# Patient Record
Sex: MALE | Race: WHITE | Employment: FULL TIME | URBAN - METROPOLITAN AREA
[De-identification: names, ages, dates, MRNs, and addresses within clinical notes are randomized per-mention and may not be internally consistent; named-entity substitution may affect disease eponyms.]

---

## 2022-03-30 ENCOUNTER — HOSPITAL ENCOUNTER (INPATIENT)
Age: 52
LOS: 2 days | Discharge: HOME | DRG: 637 | End: 2022-04-02
Attending: STUDENT IN AN ORGANIZED HEALTH CARE EDUCATION/TRAINING PROGRAM | Admitting: FAMILY MEDICINE
Payer: COMMERCIAL

## 2022-03-30 DIAGNOSIS — R73.9 HYPERGLYCEMIA: Primary | ICD-10-CM

## 2022-03-30 DIAGNOSIS — E11.9 DIABETES MELLITUS, NEW ONSET (HCC): ICD-10-CM

## 2022-03-30 DIAGNOSIS — E11.9 NEW ONSET TYPE 2 DIABETES MELLITUS (HCC): ICD-10-CM

## 2022-03-30 DIAGNOSIS — E87.20 METABOLIC ACIDOSIS: ICD-10-CM

## 2022-03-30 LAB
ALBUMIN SERPL-MCNC: 3.9 G/DL (ref 3.5–5)
ALBUMIN/GLOB SERPL: ABNORMAL {RATIO} (ref 1.1–2.2)
ALP SERPL-CCNC: 114 U/L (ref 45–117)
ALT SERPL-CCNC: ABNORMAL U/L (ref 12–78)
ANION GAP SERPL CALC-SCNC: 13 MMOL/L (ref 5–15)
APPEARANCE UR: CLEAR
AST SERPL-CCNC: ABNORMAL U/L (ref 15–37)
BACTERIA URNS QL MICRO: NEGATIVE /HPF
BASE DEFICIT BLDV-SCNC: 14.2 MMOL/L
BASOPHILS # BLD: 0 K/UL (ref 0–0.1)
BASOPHILS NFR BLD: 1 % (ref 0–1)
BILIRUB SERPL-MCNC: 1.2 MG/DL (ref 0.2–1)
BILIRUB UR QL: NEGATIVE
BUN SERPL-MCNC: 11 MG/DL (ref 6–20)
BUN/CREAT SERPL: 9 (ref 12–20)
CALCIUM SERPL-MCNC: ABNORMAL MG/DL (ref 8.5–10.1)
CHLORIDE SERPL-SCNC: 103 MMOL/L (ref 97–108)
CO2 SERPL-SCNC: 14 MMOL/L (ref 21–32)
COLOR UR: ABNORMAL
COMMENT, HOLDF: NORMAL
COMMENT, HOLDF: NORMAL
CREAT SERPL-MCNC: 1.16 MG/DL (ref 0.7–1.3)
DIFFERENTIAL METHOD BLD: ABNORMAL
EOSINOPHIL # BLD: 0.1 K/UL (ref 0–0.4)
EOSINOPHIL NFR BLD: 2 % (ref 0–7)
EPITH CASTS URNS QL MICRO: ABNORMAL /LPF
ERYTHROCYTE [DISTWIDTH] IN BLOOD BY AUTOMATED COUNT: 13.2 % (ref 11.5–14.5)
GLOBULIN SER CALC-MCNC: ABNORMAL G/DL (ref 2–4)
GLUCOSE BLD STRIP.AUTO-MCNC: 252 MG/DL (ref 65–117)
GLUCOSE BLD STRIP.AUTO-MCNC: 314 MG/DL (ref 65–117)
GLUCOSE SERPL-MCNC: 332 MG/DL (ref 65–100)
GLUCOSE UR STRIP.AUTO-MCNC: >=1000 MG/DL
HCO3 BLDV-SCNC: 11.7 MMOL/L (ref 23–28)
HCT VFR BLD AUTO: 46.1 % (ref 36.6–50.3)
HGB BLD-MCNC: 17.2 G/DL (ref 12.1–17)
HGB UR QL STRIP: ABNORMAL
IMM GRANULOCYTES # BLD AUTO: 0.1 K/UL (ref 0–0.04)
IMM GRANULOCYTES NFR BLD AUTO: 1 % (ref 0–0.5)
KETONES UR QL STRIP.AUTO: >=160 MG/DL
LEUKOCYTE ESTERASE UR QL STRIP.AUTO: NEGATIVE
LIPASE SERPL-CCNC: 104 U/L (ref 73–393)
LYMPHOCYTES # BLD: 1.6 K/UL (ref 0.8–3.5)
LYMPHOCYTES NFR BLD: 28 % (ref 12–49)
MCH RBC QN AUTO: 32.8 PG (ref 26–34)
MCHC RBC AUTO-ENTMCNC: 37.3 G/DL (ref 30–36.5)
MCV RBC AUTO: 88 FL (ref 80–99)
MONOCYTES # BLD: 0.6 K/UL (ref 0–1)
MONOCYTES NFR BLD: 11 % (ref 5–13)
NEUTS SEG # BLD: 3.3 K/UL (ref 1.8–8)
NEUTS SEG NFR BLD: 57 % (ref 32–75)
NITRITE UR QL STRIP.AUTO: NEGATIVE
NRBC # BLD: 0 K/UL (ref 0–0.01)
NRBC BLD-RTO: 0 PER 100 WBC
PCO2 BLDV: 28.3 MMHG (ref 41–51)
PH BLDV: 7.23 [PH] (ref 7.32–7.42)
PH UR STRIP: 5 [PH] (ref 5–8)
PLATELET # BLD AUTO: 146 K/UL (ref 150–400)
PMV BLD AUTO: 9.1 FL (ref 8.9–12.9)
PO2 BLDV: 37 MMHG (ref 25–40)
POTASSIUM SERPL-SCNC: 4 MMOL/L (ref 3.5–5.1)
PROT SERPL-MCNC: ABNORMAL G/DL (ref 6.4–8.2)
PROT UR STRIP-MCNC: 30 MG/DL
RBC # BLD AUTO: 5.24 M/UL (ref 4.1–5.7)
RBC #/AREA URNS HPF: ABNORMAL /HPF (ref 0–5)
SAMPLES BEING HELD,HOLD: NORMAL
SAMPLES BEING HELD,HOLD: NORMAL
SAO2 % BLDV: 61.1 % (ref 65–88)
SERVICE CMNT-IMP: ABNORMAL
SODIUM SERPL-SCNC: 130 MMOL/L (ref 136–145)
SPECIMEN TYPE: ABNORMAL
UROBILINOGEN UR QL STRIP.AUTO: 0.2 EU/DL (ref 0.2–1)
WBC # BLD AUTO: 5.8 K/UL (ref 4.1–11.1)
WBC URNS QL MICRO: ABNORMAL /HPF (ref 0–4)

## 2022-03-30 PROCEDURE — 82010 KETONE BODYS QUAN: CPT

## 2022-03-30 PROCEDURE — 85025 COMPLETE CBC W/AUTO DIFF WBC: CPT

## 2022-03-30 PROCEDURE — 82962 GLUCOSE BLOOD TEST: CPT

## 2022-03-30 PROCEDURE — 81001 URINALYSIS AUTO W/SCOPE: CPT

## 2022-03-30 PROCEDURE — 82803 BLOOD GASES ANY COMBINATION: CPT

## 2022-03-30 PROCEDURE — 93005 ELECTROCARDIOGRAM TRACING: CPT

## 2022-03-30 PROCEDURE — 83690 ASSAY OF LIPASE: CPT

## 2022-03-30 PROCEDURE — 83036 HEMOGLOBIN GLYCOSYLATED A1C: CPT

## 2022-03-30 PROCEDURE — 74011250636 HC RX REV CODE- 250/636: Performed by: STUDENT IN AN ORGANIZED HEALTH CARE EDUCATION/TRAINING PROGRAM

## 2022-03-30 PROCEDURE — 80053 COMPREHEN METABOLIC PANEL: CPT

## 2022-03-30 PROCEDURE — 36415 COLL VENOUS BLD VENIPUNCTURE: CPT

## 2022-03-30 PROCEDURE — 99285 EMERGENCY DEPT VISIT HI MDM: CPT

## 2022-03-30 PROCEDURE — 96361 HYDRATE IV INFUSION ADD-ON: CPT

## 2022-03-30 RX ORDER — BENZALKONIUM CHLORIDE 1.3 MG/ML
CLOTH TOPICAL
Qty: 100 STRIP | Refills: 2 | Status: SHIPPED | OUTPATIENT
Start: 2022-03-30

## 2022-03-30 RX ORDER — LANCETS
EACH MISCELLANEOUS
Qty: 1 EACH | Refills: 11 | Status: SHIPPED | OUTPATIENT
Start: 2022-03-30

## 2022-03-30 RX ORDER — SODIUM CHLORIDE 9 MG/ML
2000 INJECTION, SOLUTION INTRAVENOUS ONCE
Status: COMPLETED | OUTPATIENT
Start: 2022-03-30 | End: 2022-03-30

## 2022-03-30 RX ORDER — INSULIN PUMP SYRINGE, 3 ML
EACH MISCELLANEOUS
Qty: 1 KIT | Refills: 0 | Status: SHIPPED | OUTPATIENT
Start: 2022-03-30

## 2022-03-30 RX ORDER — METFORMIN HYDROCHLORIDE 500 MG/1
500 TABLET ORAL 2 TIMES DAILY WITH MEALS
Qty: 60 TABLET | Refills: 0 | Status: SHIPPED | OUTPATIENT
Start: 2022-03-30 | End: 2022-04-02 | Stop reason: SDUPTHER

## 2022-03-30 RX ADMIN — SODIUM CHLORIDE 2000 ML: 9 INJECTION, SOLUTION INTRAVENOUS at 19:58

## 2022-03-30 NOTE — ED TRIAGE NOTES
Pt presents to ER with c/o frequent urination, dry mouth, nausea, constant thirst for the last 3 weeks with worsening sx today. BG 314mg/dl in triage. Pt has family history of T2DM, but no personal history. Sent from [facility] for hyperglycemia work-up and abdominal pain.

## 2022-03-31 ENCOUNTER — APPOINTMENT (OUTPATIENT)
Dept: CT IMAGING | Age: 52
DRG: 637 | End: 2022-03-31
Attending: INTERNAL MEDICINE
Payer: COMMERCIAL

## 2022-03-31 PROBLEM — E11.9 NEW ONSET TYPE 2 DIABETES MELLITUS (HCC): Status: ACTIVE | Noted: 2022-03-31

## 2022-03-31 LAB
ANION GAP SERPL CALC-SCNC: 9 MMOL/L (ref 5–15)
ATRIAL RATE: 90 BPM
B-OH-BUTYR SERPL-SCNC: >4.42 MMOL/L
BUN SERPL-MCNC: 10 MG/DL (ref 6–20)
BUN/CREAT SERPL: 10 (ref 12–20)
CALCIUM SERPL-MCNC: 7 MG/DL (ref 8.5–10.1)
CALCULATED P AXIS, ECG09: 29 DEGREES
CALCULATED R AXIS, ECG10: 6 DEGREES
CALCULATED T AXIS, ECG11: 15 DEGREES
CHLORIDE SERPL-SCNC: 113 MMOL/L (ref 97–108)
CHOLEST SERPL-MCNC: 246 MG/DL
CO2 SERPL-SCNC: 15 MMOL/L (ref 21–32)
CREAT SERPL-MCNC: 1.04 MG/DL (ref 0.7–1.3)
DIAGNOSIS, 93000: NORMAL
ERYTHROCYTE [DISTWIDTH] IN BLOOD BY AUTOMATED COUNT: 13.1 % (ref 11.5–14.5)
EST. AVERAGE GLUCOSE BLD GHB EST-MCNC: 243 MG/DL
GLUCOSE BLD STRIP.AUTO-MCNC: 205 MG/DL (ref 65–117)
GLUCOSE BLD STRIP.AUTO-MCNC: 221 MG/DL (ref 65–117)
GLUCOSE BLD STRIP.AUTO-MCNC: 222 MG/DL (ref 65–117)
GLUCOSE BLD STRIP.AUTO-MCNC: 227 MG/DL (ref 65–117)
GLUCOSE BLD STRIP.AUTO-MCNC: 242 MG/DL (ref 65–117)
GLUCOSE SERPL-MCNC: 266 MG/DL (ref 65–100)
HBA1C MFR BLD: 10.1 % (ref 4–5.6)
HCT VFR BLD AUTO: 39.4 % (ref 36.6–50.3)
HDLC SERPL-MCNC: 16 MG/DL
HDLC SERPL: 15.4 {RATIO} (ref 0–5)
HGB BLD-MCNC: 14.4 G/DL (ref 12.1–17)
LDLC SERPL CALC-MCNC: ABNORMAL MG/DL (ref 0–100)
LDLC SERPL DIRECT ASSAY-MCNC: 50 MG/DL (ref 0–100)
MCH RBC QN AUTO: 32.4 PG (ref 26–34)
MCHC RBC AUTO-ENTMCNC: 36.5 G/DL (ref 30–36.5)
MCV RBC AUTO: 88.7 FL (ref 80–99)
NRBC # BLD: 0 K/UL (ref 0–0.01)
NRBC BLD-RTO: 0 PER 100 WBC
P-R INTERVAL, ECG05: 146 MS
PLATELET # BLD AUTO: 115 K/UL (ref 150–400)
PMV BLD AUTO: 8.7 FL (ref 8.9–12.9)
POTASSIUM SERPL-SCNC: 3.5 MMOL/L (ref 3.5–5.1)
Q-T INTERVAL, ECG07: 368 MS
QRS DURATION, ECG06: 96 MS
QTC CALCULATION (BEZET), ECG08: 450 MS
RBC # BLD AUTO: 4.44 M/UL (ref 4.1–5.7)
SERVICE CMNT-IMP: ABNORMAL
SODIUM SERPL-SCNC: 137 MMOL/L (ref 136–145)
TRIGL SERPL-MCNC: 1513 MG/DL (ref ?–150)
VENTRICULAR RATE, ECG03: 90 BPM
VLDLC SERPL CALC-MCNC: ABNORMAL MG/DL
WBC # BLD AUTO: 5.3 K/UL (ref 4.1–11.1)

## 2022-03-31 PROCEDURE — 74011250636 HC RX REV CODE- 250/636: Performed by: INTERNAL MEDICINE

## 2022-03-31 PROCEDURE — 80048 BASIC METABOLIC PNL TOTAL CA: CPT

## 2022-03-31 PROCEDURE — 74011000250 HC RX REV CODE- 250: Performed by: FAMILY MEDICINE

## 2022-03-31 PROCEDURE — 96374 THER/PROPH/DIAG INJ IV PUSH: CPT

## 2022-03-31 PROCEDURE — 74011250637 HC RX REV CODE- 250/637: Performed by: INTERNAL MEDICINE

## 2022-03-31 PROCEDURE — 74011636637 HC RX REV CODE- 636/637: Performed by: FAMILY MEDICINE

## 2022-03-31 PROCEDURE — 65660000000 HC RM CCU STEPDOWN

## 2022-03-31 PROCEDURE — 65270000029 HC RM PRIVATE

## 2022-03-31 PROCEDURE — 86341 ISLET CELL ANTIBODY: CPT

## 2022-03-31 PROCEDURE — 74011250637 HC RX REV CODE- 250/637: Performed by: FAMILY MEDICINE

## 2022-03-31 PROCEDURE — 74011000636 HC RX REV CODE- 636: Performed by: RADIOLOGY

## 2022-03-31 PROCEDURE — 82962 GLUCOSE BLOOD TEST: CPT

## 2022-03-31 PROCEDURE — 83721 ASSAY OF BLOOD LIPOPROTEIN: CPT

## 2022-03-31 PROCEDURE — 74011250636 HC RX REV CODE- 250/636: Performed by: FAMILY MEDICINE

## 2022-03-31 PROCEDURE — 85027 COMPLETE CBC AUTOMATED: CPT

## 2022-03-31 PROCEDURE — 74178 CT ABD&PLV WO CNTR FLWD CNTR: CPT

## 2022-03-31 PROCEDURE — 80061 LIPID PANEL: CPT

## 2022-03-31 PROCEDURE — 74011636637 HC RX REV CODE- 636/637: Performed by: INTERNAL MEDICINE

## 2022-03-31 PROCEDURE — 74011636637 HC RX REV CODE- 636/637: Performed by: EMERGENCY MEDICINE

## 2022-03-31 PROCEDURE — 36415 COLL VENOUS BLD VENIPUNCTURE: CPT

## 2022-03-31 PROCEDURE — 99233 SBSQ HOSP IP/OBS HIGH 50: CPT | Performed by: CLINICAL NURSE SPECIALIST

## 2022-03-31 RX ORDER — MORPHINE SULFATE 2 MG/ML
2 INJECTION, SOLUTION INTRAMUSCULAR; INTRAVENOUS
Status: DISCONTINUED | OUTPATIENT
Start: 2022-03-31 | End: 2022-04-02 | Stop reason: HOSPADM

## 2022-03-31 RX ORDER — ENOXAPARIN SODIUM 100 MG/ML
40 INJECTION SUBCUTANEOUS DAILY
Status: DISCONTINUED | OUTPATIENT
Start: 2022-03-31 | End: 2022-04-02 | Stop reason: HOSPADM

## 2022-03-31 RX ORDER — ONDANSETRON 2 MG/ML
4 INJECTION INTRAMUSCULAR; INTRAVENOUS
Status: DISCONTINUED | OUTPATIENT
Start: 2022-03-31 | End: 2022-04-02 | Stop reason: HOSPADM

## 2022-03-31 RX ORDER — ACETAMINOPHEN 650 MG/1
650 SUPPOSITORY RECTAL
Status: DISCONTINUED | OUTPATIENT
Start: 2022-03-31 | End: 2022-04-02 | Stop reason: HOSPADM

## 2022-03-31 RX ORDER — MAGNESIUM SULFATE 100 %
4 CRYSTALS MISCELLANEOUS AS NEEDED
Status: DISCONTINUED | OUTPATIENT
Start: 2022-03-31 | End: 2022-04-02 | Stop reason: HOSPADM

## 2022-03-31 RX ORDER — METFORMIN HYDROCHLORIDE 500 MG/1
500 TABLET ORAL 2 TIMES DAILY WITH MEALS
Status: DISCONTINUED | OUTPATIENT
Start: 2022-04-02 | End: 2022-04-02 | Stop reason: HOSPADM

## 2022-03-31 RX ORDER — FENOFIBRATE 145 MG/1
145 TABLET, COATED ORAL DAILY
Status: DISCONTINUED | OUTPATIENT
Start: 2022-03-31 | End: 2022-04-02 | Stop reason: HOSPADM

## 2022-03-31 RX ORDER — LISINOPRIL 5 MG/1
10 TABLET ORAL DAILY
Status: DISCONTINUED | OUTPATIENT
Start: 2022-03-31 | End: 2022-04-02 | Stop reason: HOSPADM

## 2022-03-31 RX ORDER — SODIUM CHLORIDE 0.9 % (FLUSH) 0.9 %
5-40 SYRINGE (ML) INJECTION EVERY 8 HOURS
Status: DISCONTINUED | OUTPATIENT
Start: 2022-03-31 | End: 2022-04-02 | Stop reason: HOSPADM

## 2022-03-31 RX ORDER — DEXTROSE MONOHYDRATE 100 MG/ML
0-250 INJECTION, SOLUTION INTRAVENOUS AS NEEDED
Status: DISCONTINUED | OUTPATIENT
Start: 2022-03-31 | End: 2022-04-02 | Stop reason: HOSPADM

## 2022-03-31 RX ORDER — SODIUM CHLORIDE 0.9 % (FLUSH) 0.9 %
5-40 SYRINGE (ML) INJECTION AS NEEDED
Status: DISCONTINUED | OUTPATIENT
Start: 2022-03-31 | End: 2022-04-02 | Stop reason: HOSPADM

## 2022-03-31 RX ORDER — INSULIN GLARGINE 100 [IU]/ML
0.2 INJECTION, SOLUTION SUBCUTANEOUS DAILY
Status: DISCONTINUED | OUTPATIENT
Start: 2022-03-31 | End: 2022-03-31

## 2022-03-31 RX ORDER — INSULIN LISPRO 100 [IU]/ML
INJECTION, SOLUTION INTRAVENOUS; SUBCUTANEOUS
Status: DISCONTINUED | OUTPATIENT
Start: 2022-03-31 | End: 2022-03-31

## 2022-03-31 RX ORDER — SODIUM CHLORIDE 9 MG/ML
150 INJECTION, SOLUTION INTRAVENOUS CONTINUOUS
Status: DISCONTINUED | OUTPATIENT
Start: 2022-03-31 | End: 2022-03-31

## 2022-03-31 RX ORDER — POLYETHYLENE GLYCOL 3350 17 G/17G
17 POWDER, FOR SOLUTION ORAL DAILY PRN
Status: DISCONTINUED | OUTPATIENT
Start: 2022-03-31 | End: 2022-04-02 | Stop reason: HOSPADM

## 2022-03-31 RX ORDER — SODIUM CHLORIDE, SODIUM LACTATE, POTASSIUM CHLORIDE, CALCIUM CHLORIDE 600; 310; 30; 20 MG/100ML; MG/100ML; MG/100ML; MG/100ML
150 INJECTION, SOLUTION INTRAVENOUS CONTINUOUS
Status: DISCONTINUED | OUTPATIENT
Start: 2022-03-31 | End: 2022-04-02 | Stop reason: HOSPADM

## 2022-03-31 RX ORDER — ACETAMINOPHEN 325 MG/1
650 TABLET ORAL
Status: DISCONTINUED | OUTPATIENT
Start: 2022-03-31 | End: 2022-04-02 | Stop reason: HOSPADM

## 2022-03-31 RX ORDER — ONDANSETRON 4 MG/1
4 TABLET, ORALLY DISINTEGRATING ORAL
Status: DISCONTINUED | OUTPATIENT
Start: 2022-03-31 | End: 2022-04-02 | Stop reason: HOSPADM

## 2022-03-31 RX ORDER — INSULIN LISPRO 100 [IU]/ML
INJECTION, SOLUTION INTRAVENOUS; SUBCUTANEOUS
Status: DISCONTINUED | OUTPATIENT
Start: 2022-03-31 | End: 2022-04-02 | Stop reason: HOSPADM

## 2022-03-31 RX ORDER — INSULIN GLARGINE 100 [IU]/ML
0.3 INJECTION, SOLUTION SUBCUTANEOUS DAILY
Status: DISCONTINUED | OUTPATIENT
Start: 2022-04-01 | End: 2022-04-02 | Stop reason: HOSPADM

## 2022-03-31 RX ADMIN — Medication 3 UNITS: at 16:30

## 2022-03-31 RX ADMIN — INSULIN GLARGINE 19 UNITS: 100 INJECTION, SOLUTION SUBCUTANEOUS at 09:13

## 2022-03-31 RX ADMIN — INSULIN LISPRO 2 UNITS: 100 INJECTION, SOLUTION INTRAVENOUS; SUBCUTANEOUS at 22:53

## 2022-03-31 RX ADMIN — Medication 3 UNITS: at 12:08

## 2022-03-31 RX ADMIN — Medication 10 UNITS: at 01:21

## 2022-03-31 RX ADMIN — SODIUM CHLORIDE 150 ML/HR: 9 INJECTION, SOLUTION INTRAVENOUS at 02:05

## 2022-03-31 RX ADMIN — FENOFIBRATE 145 MG: 145 TABLET ORAL at 12:08

## 2022-03-31 RX ADMIN — ENOXAPARIN SODIUM 40 MG: 40 INJECTION SUBCUTANEOUS at 09:15

## 2022-03-31 RX ADMIN — SODIUM CHLORIDE, POTASSIUM CHLORIDE, SODIUM LACTATE AND CALCIUM CHLORIDE 150 ML/HR: 600; 310; 30; 20 INJECTION, SOLUTION INTRAVENOUS at 11:21

## 2022-03-31 RX ADMIN — SODIUM CHLORIDE, PRESERVATIVE FREE 10 ML: 5 INJECTION INTRAVENOUS at 05:30

## 2022-03-31 RX ADMIN — SODIUM CHLORIDE, POTASSIUM CHLORIDE, SODIUM LACTATE AND CALCIUM CHLORIDE 150 ML/HR: 600; 310; 30; 20 INJECTION, SOLUTION INTRAVENOUS at 18:45

## 2022-03-31 RX ADMIN — Medication 3 UNITS: at 09:13

## 2022-03-31 RX ADMIN — SODIUM CHLORIDE 1000 ML: 9 INJECTION, SOLUTION INTRAVENOUS at 02:39

## 2022-03-31 RX ADMIN — IOPAMIDOL 100 ML: 755 INJECTION, SOLUTION INTRAVENOUS at 12:48

## 2022-03-31 RX ADMIN — LISINOPRIL 10 MG: 5 TABLET ORAL at 09:17

## 2022-03-31 NOTE — PROGRESS NOTES
Juan David Frazier [facility] [address]  [address] [facility], [place], [address] [place] [address]  [phone]      Medical Progress Note      NAME: Johnnie Cisneros   :  1970  MRM:  [id number]    Date/Time: 3/31/2022  5:50 PM           Assessment / Plan:     #DKA/HHS: Atypical presentation without anion gap but did have elevated bOHb and ketonuria. BGs in 300s. Responded to basal bolus insulin and did not require gtt. A1c is 10.1% and has had weight loss, poor po intake so starvation ketosis possible. Also of note is elevated TGs to 1500 so do query chronic pancreatitis leading to endocrine dysfx. Does not drink EtOH.    - Glargine 0.2u/kg + SSI for now   - LR @ 150cc/hr   - Start metformin post contrast washout   - Appreciate Endo/DM recs    #HyperT,513 here. Lipase is normal, but he has e/o fat stranding on imaging. Does have hypocalcemia but no organ failure and reassuring vitals and exam. Therefore does not need PLEX. Will also hold on insulin gtt, but incr glar to 0.3u/kg and increase SSI to resistant   - Insulin as noted   - Tx pancreatitis   - Start fibrate today   - Lipids tmr   - Low threshold insulin gtt vs plex    #Acute Pancreatitis: Normal lipase, but hypocalcemia and clinical hx concerning. CT w/ mild stranding. Suspect 2/2 hyperTG as noted above   - Change IVF to LR   - ADAT    #NAGMA: Bicarb low, suspect 2/2 GI losses vs RTA vs NS   - Change NS to LR   - Trend    #Hypocalcemia: Possibly 2/2 saponification   - Trend    #ALMAZAN: Does have thrombocytopenia so will check coags to consider cirrhosis    #HTNL Lisin 10mg, new                     Care Plan discussed with: Patient    Discussed:  Care Plan    Prophylaxis:  Lovenox    Disposition:  Home w/Family           ___________________________________________________    Attending Physician: Ramón Avitia MD        Subjective:     Chief Complaint:  Deneice Reagan.  Feels better today, mild abd pain    ROS:  (bold if positive, if negative)    Tolerating PT  Tolerating Diet          Objective:       Vitals:          Last 24hrs VS reviewed since prior progress note.  Most recent are:    Visit Vitals  BP (!) 115/53 (BP 1 Location: Right upper arm, BP Patient Position: At rest)   Pulse 80   Temp 97.9 °F (36.6 °C)   Resp 16   Ht 5' 8\" (1.727 m)   Wt 93.1 kg (205 lb 4 oz)   SpO2 96%   BMI 31.21 kg/m²     SpO2 Readings from Last 6 Encounters:   03/31/22 96%            Intake/Output Summary (Last 24 hours) at 3/31/2022 1750  Last data filed at 3/31/2022 2775  Gross per 24 hour   Intake 2825 ml   Output    Net 2825 ml          Exam:     Physical Exam:    Gen:  Well-developed, well-nourished, in no acute distress  HEENT:  Pink conjunctivae, PERRL, hearing intact to voice, moist mucous membranes  Neck:  Supple, without masses, thyroid non-tender  Resp:  No accessory muscle use, clear breath sounds without wheezes rales or rhonchi  Card:  No murmurs, normal S1, S2 without thrills, bruits or peripheral edema  Abd:  Soft, non-tender, non-distended, normoactive bowel sounds are present  Musc:  No cyanosis or clubbing  Skin:  No rashes or ulcers, skin turgor is good  Neuro:  Cranial nerves 3-12 are grossly intact,  strength is 5/5 bilaterally and dorsi / plantarflexion is 5/5 bilaterally, follows commands appropriately  Psych:  Good insight, oriented to person, place and time, alert    Medications Reviewed: (see below)    Lab Data Reviewed: (see below)    ______________________________________________________________________    Medications:     Current Facility-Administered Medications   Medication Dose Route Frequency    sodium chloride (NS) flush 5-40 mL  5-40 mL IntraVENous Q8H    sodium chloride (NS) flush 5-40 mL  5-40 mL IntraVENous PRN    acetaminophen (TYLENOL) tablet 650 mg  650 mg Oral Q6H PRN    Or    acetaminophen (TYLENOL) suppository 650 mg  650 mg Rectal Q6H PRN    polyethylene glycol (MIRALAX) packet 17 g  17 g Oral DAILY PRN    ondansetron (ZOFRAN ODT) tablet 4 mg  4 mg Oral Q8H PRN    Or    ondansetron (ZOFRAN) injection 4 mg  4 mg IntraVENous Q6H PRN    enoxaparin (LOVENOX) injection 40 mg  40 mg SubCUTAneous DAILY    glucose chewable tablet 16 g  4 Tablet Oral PRN    dextrose 10% infusion 0-250 mL  0-250 mL IntraVENous PRN    glucagon (GLUCAGEN) injection 1 mg  1 mg IntraMUSCular PRN    insulin glargine (LANTUS) injection 19 Units  0.2 Units/kg SubCUTAneous DAILY    insulin lispro (HUMALOG) injection   SubCUTAneous AC&HS    lisinopriL (PRINIVIL, ZESTRIL) tablet 10 mg  10 mg Oral DAILY    fenofibrate nanocrystallized (TRICOR) tablet 145 mg  145 mg Oral DAILY    lactated Ringers infusion  150 mL/hr IntraVENous CONTINUOUS    [START ON 4/2/2022] metFORMIN (GLUCOPHAGE) tablet 500 mg  500 mg Oral BID WITH MEALS            Lab Review:     Recent Labs     03/31/22 0237 03/30/22 1934   WBC 5.3 5.8   HGB 14.4 17.2*   HCT 39.4 46.1   * 146*     Recent Labs     03/31/22 0237 03/30/22 1934    130*   K 3.5 4.0   * 103   CO2 15* 14*   * 332*   BUN 10 11   CREA 1.04 1.16   CA 7.0* UNABLE TO OBTAIN ACCURATE RESULTS DUE TO GROSS LIPEMIA   ALB  --  3.9   ALT  --  UNABLE TO OBTAIN ACCURATE RESULTS DUE TO GROSS LIPEMIA     No components found for: [place]

## 2022-03-31 NOTE — PROGRESS NOTES
Critical CO of 15 reported to Vivien Swain MD at 0400. SBAR, BSR, med rec, updates and patient hand off given to [facility], RN at 0730. Pt had no acute events overnight. Pt was oriented to the unit and educated on fall precautions.

## 2022-03-31 NOTE — CONSULTS
Nutrition Education    · Verbally reviewed information with Patient and Family member  · Educated on carbohydrate intake, blood sugar management related to dietary modifications  · Written educational materials provided. · Contact name and number provided.       Electronically signed by Janet Edmonds RD on 7/20/3174   Contact Number: [phone] or via [employer]

## 2022-03-31 NOTE — PROGRESS NOTES
Reason for Admission:  New onset type 2 diabetes mellitus                   RUR Score:   4%                  Plan for utilizing home health:     No     PCP: First and Last name:  New patient appointment scheduled with Dr. Briseida Dominique on 4/11/22   [facility]   Are you a current patient: Yes/No: No   Approximate date of last visit:    Can you participate in a virtual visit with your PCP:                     Current Advanced Directive/Advance Care Plan: Full Code    Healthcare Decision Maker:   Cherelle Valderrama, spouse - [phone]                      Transition of Care Plan:                    CM met with patient and spouse to begin discharge planning. They live in a two story house at charted address in [place]. Patient is independent with ADLs, drives and works. He has no history of home health and does not own/use any DME. Patient has prescription drug coverage and uses [facility] on [address]. Information on [facility] was provided to patient. 1. CM to follow  2. Await further evaluation and response to treatment  3. Outpatient follow-up; establish with a PCP  4. Discharge home with family when medically stable  5. Spouse will transport at discharge    Care Management Interventions  PCP Verified by CM:  Yes  Support Systems: Spouse/Significant Other  Confirm Follow Up Transport: Family  The Plan for Transition of Care is Related to the Following Treatment Goals : New onset type 2 diabetes mellitus  Discharge Location  Patient Expects to be Discharged to[de-identified] Home     Ana María Gutierrez LCSW

## 2022-03-31 NOTE — PROGRESS NOTES
Education was done on blood sugar monitoring and insulin administration. Pt. very receptive to education and had no barriers to learning. Was able to adequately demonstrate with instruction blood sugar check and insulin administration.

## 2022-03-31 NOTE — ED PROVIDER NOTES
The patient is a 42-year-old male who has no medical history here today with hyperglycemia. Patient was being seen at [facility] today for complaints of polyuria, polydipsia, fatigue, nausea and epigastric pain for the past several weeks to months. Epigastric pain was worse today which is what prompted him to get seen. He was noted to have glucose in his urine. They were unable to get a metabolic panel. He was sent here for further management. He has no known history of diabetes. He has an extensive family history of diabetes. He denies chest pain, shortness of breath, fevers or chills, vomiting or diarrhea. Social History     Socioeconomic History    Marital status:      Spouse name: Not on file    Number of children: Not on file    Years of education: Not on file    Highest education level: Not on file   Occupational History    Not on file   Tobacco Use    Smoking status: Not on file    Smokeless tobacco: Not on file   Substance and Sexual Activity    Alcohol use: Not on file    Drug use: Not on file    Sexual activity: Not on file   Other Topics Concern    Not on file   Social History Narrative    Not on file     Social Determinants of Health     Financial Resource Strain:     Difficulty of Paying Living Expenses: Not on file   Food Insecurity:     Worried About Running Out of Food in the Last Year: Not on file    Tru of Food in the Last Year: Not on file   Transportation Needs:     Lack of Transportation (Medical): Not on file    Lack of Transportation (Non-Medical):  Not on file   Physical Activity:     Days of Exercise per Week: Not on file    Minutes of Exercise per Session: Not on file   Stress:     Feeling of Stress : Not on file   Social Connections:     Frequency of Communication with Friends and Family: Not on file    Frequency of Social Gatherings with Friends and Family: Not on file    Attends Quaker Services: Not on file   [employer] of Clubs or Organizations: Not on file    Attends Club or Organization Meetings: Not on file    Marital Status: Not on file   Intimate Partner Violence:     Fear of Current or Ex-Partner: Not on file    Emotionally Abused: Not on file    Physically Abused: Not on file    Sexually Abused: Not on file   Housing Stability:     Unable to Pay for Housing in the Last Year: Not on file    Number of [place] in the Last Year: Not on file    Unstable Housing in the Last Year: Not on file         ALLERGIES: Patient has no known allergies. Review of Systems   Constitutional: Positive for fatigue. Negative for chills and fever. HENT: Negative for congestion and sore throat. Eyes: Negative for pain and redness. Respiratory: Negative for cough and shortness of breath. Cardiovascular: Negative for chest pain and palpitations. Gastrointestinal: Positive for abdominal pain and nausea. Negative for diarrhea and vomiting. Endocrine: Positive for polydipsia and polyuria. Genitourinary: Negative for frequency and hematuria. Musculoskeletal: Negative for back pain and neck pain. Skin: Negative for rash and wound. Neurological: Negative for dizziness and headaches. Hematological: Does not bruise/bleed easily. Vitals:    03/30/22 1738 03/30/22 1929 03/30/22 1944   BP: (!) 171/85  (!) 170/95   Pulse: (!) 107  93   Resp: 18  20   Temp: 99.1 °F (37.3 °C)     SpO2: 96% 96%    Weight: 93.1 kg (205 lb 4 oz)     Height: 5' 8\" (1.727 m)              Physical Exam  Vitals and nursing note reviewed. Constitutional:       General: He is not in acute distress. Appearance: He is well-developed. HENT:      Head: Normocephalic and atraumatic. Mouth/Throat:      Mouth: Mucous membranes are dry. Eyes:      Conjunctiva/sclera: Conjunctivae normal.      Pupils: Pupils are equal, round, and reactive to light. Cardiovascular:      Rate and Rhythm: Normal rate and regular rhythm.       Heart sounds: Normal heart sounds. No murmur heard. No friction rub. No gallop. Comments: Equal radial, dp, and pt pulses  Pulmonary:      Effort: Pulmonary effort is normal. No respiratory distress. Breath sounds: Normal breath sounds. No wheezing or rales. Abdominal:      General: Bowel sounds are normal. There is no distension. Palpations: Abdomen is soft. Tenderness: There is no abdominal tenderness. There is no guarding or rebound. Musculoskeletal:         General: Normal range of motion. Cervical back: Normal range of motion and neck supple. Skin:     General: Skin is warm and dry. Capillary Refill: Capillary refill takes less than 2 seconds. Findings: No rash. Neurological:      Mental Status: He is alert and oriented to person, place, and time. MDM       Procedures        Patient is a 73-year-old male here with new onset diabetes. Hyperglycemic upon arrival.  2 L of IV fluids initially ordered. Vital signs are stable. Also complaining of some epigastric pain, lipase ordered. At the time of my signout no labs have returned except for VBG showing mild acidemia. Will need further labs prior to disposition. Patient was signed out to Dr. Melody Sanchez pending review of labs.     Fabrizio Nelson DO

## 2022-03-31 NOTE — H&P
[address] Adult  Hospitalist Group    History & Physical    Date of service: 3/30/2022    Patient name: Bri Sawyer  MRN: [id number]  YOB: 1970  Age: 54 y.o. Primary care provider:  None     Source of Information: patient, medical records                              Chief complain: Hyperglycemia    History of present illness  Bri Sawyer is a 54 y.o. male who presented with hyperglycemia. He was seen at [facility] earlier today due to frequent urination, dry mouth, nausea and polydipsia for the past 3 weeks that have been progressively worsening. He was advised to go to the ER for further management after having glucose in his urine. He has a strong family history of diabetes. He has some nonspecific abdominal pain. No diarrhea or nausea/vomiting. No fevers. No past medical history on file. No past surgical history on file. Prior to Admission medications    Medication Sig Start Date End Date Taking? Authorizing Provider   metFORMIN (GLUCOPHAGE) 500 mg tablet Take 1 Tablet by mouth two (2) times daily (with meals) for 30 days. 3/30/22 4/29/22 Yes Fabrizio Nelson DO   Blood-Glucose Meter monitoring kit Use 4 times per day (before meals and bedtime) for blood glucose testing    Type 2 diabetes mellitus with hyperglycemia E11. 65. 3/30/22  Yes Fabrizio Nelson DO   lancets misc Use 4 times per day (before meals and bedtime) for blood glucose testing    Type 2 diabetes mellitus with hyperglycemia E11. 65. 3/30/22  Yes Fabrizio Nelson DO   glucose blood VI test strips (CareTouch Test Strip) strip Use 4 times per day (before meals and bedtime) for blood glucose testing    Type 2 diabetes mellitus with hyperglycemia E11. 65. 3/30/22  Yes Fabrizio Nelson DO     No Known Allergies   No family history on file.       Social history    Social History     Tobacco Use   Smoking Status Not on file   Smokeless Tobacco Not on file       Social History Substance and Sexual Activity   Alcohol Use Not on file       Code status  Code status discussed with the patient/caregivers. No Order    Review of systems    A comprehensive review of systems was negative except for that written in the History of Present Illness. Physical Examination   Visit Vitals  BP (!) 149/86   Pulse 92   Temp 99.1 °F (37.3 °C)   Resp 20   Ht 5' 8\" (1.727 m)   Wt 93.1 kg (205 lb 4 oz)   SpO2 96%   BMI 31.21 kg/m²          O2 Device: None (Room air)    General:  Alert, cooperative, no distress   Head:  Normocephalic, without obvious abnormality, atraumatic   Eyes:  Conjunctivae/corneas clear.  PERRL, EOMs intact   Head/Neck: Nares normal. No nasal drainage or sinus tenderness  Lips, mucosa, and tongue normal   Teeth and gums normal  Clear oropharynx  Trachea midline  No palpable adenopathy  No thyroid enlargement, tenderness     Lungs:   Symmetrical chest expansion and respiratory effort  Clear to auscultation bilaterally       Heart:  Regular rhythm   Sounds normal; no murmur, rub or gallop   Abdomen:   Soft, no tenderness  Bowel sounds normal  No masses or hepatosplenomegaly     Back: No CVA tenderness   Extremities: Extremities normal, atraumatic  No cyanosis or edema     Skin: No rashes or ulcers   Musculo-      skeletal: Gait not tested  Normal symmetry, ROM, strength and tone   Neuro: Cranial nerves grossly normal     Psych: Alert, oriented x3  Normal affect, judgement and insight     Data Review    24 Hour Results:  Recent Results (from the past 24 hour(s))   GLUCOSE, POC    Collection Time: 03/30/22  5:37 PM   Result Value Ref Range    Glucose (POC) 314 (H) 65 - 117 mg/dL    Performed by WORTHY KEKE (TRAVEL)    CBC WITH AUTOMATED DIFF    Collection Time: 03/30/22  7:34 PM   Result Value Ref Range    WBC 5.8 4.1 - 11.1 K/uL    RBC 5.24 4.10 - 5.70 M/uL    HGB 17.2 (H) 12.1 - 17.0 g/dL    HCT 46.1 36.6 - 50.3 %    MCV 88.0 80.0 - 99.0 FL    MCH 32.8 26.0 - 34.0 PG    MCHC 37.3 (H) 30.0 - 36.5 g/dL    RDW 13.2 11.5 - 14.5 %    PLATELET 280 (L) 264 - 400 K/uL    MPV 9.1 8.9 - 12.9 FL    NRBC 0.0 0  WBC    ABSOLUTE NRBC 0.00 0.00 - 0.01 K/uL    NEUTROPHILS 57 32 - 75 %    LYMPHOCYTES 28 12 - 49 %    MONOCYTES 11 5 - 13 %    EOSINOPHILS 2 0 - 7 %    BASOPHILS 1 0 - 1 %    IMMATURE GRANULOCYTES 1 (H) 0.0 - 0.5 %    ABS. NEUTROPHILS 3.3 1.8 - 8.0 K/UL    ABS. LYMPHOCYTES 1.6 0.8 - 3.5 K/UL    ABS. MONOCYTES 0.6 0.0 - 1.0 K/UL    ABS. EOSINOPHILS 0.1 0.0 - 0.4 K/UL    ABS. BASOPHILS 0.0 0.0 - 0.1 K/UL    ABS. IMM. GRANS. 0.1 (H) 0.00 - 0.04 K/UL    DF AUTOMATED     METABOLIC PANEL, COMPREHENSIVE    Collection Time: 03/30/22  7:34 PM   Result Value Ref Range    Sodium 130 (L) 136 - 145 mmol/L    Potassium 4.0 3.5 - 5.1 mmol/L    Chloride 103 97 - 108 mmol/L    CO2 14 (LL) 21 - 32 mmol/L    Anion gap 13 5 - 15 mmol/L    Glucose 332 (H) 65 - 100 mg/dL    BUN 11 6 - 20 MG/DL    Creatinine 1.16 0.70 - 1.30 MG/DL    BUN/Creatinine ratio 9 (L) 12 - 20      GFR est AA >60 >60 ml/min/1.73m2    GFR est non-AA >60 >60 ml/min/1.73m2    Calcium  8.5 - 10.1 MG/DL     UNABLE TO OBTAIN ACCURATE RESULTS DUE TO GROSS LIPEMIA    Bilirubin, total 1.2 (H) 0.2 - 1.0 MG/DL    ALT (SGPT)  12 - 78 U/L     UNABLE TO OBTAIN ACCURATE RESULTS DUE TO GROSS LIPEMIA    AST (SGOT)  15 - 37 U/L     UNABLE TO OBTAIN ACCURATE RESULTS DUE TO GROSS LIPEMIA    Alk.  phosphatase 114 45 - 117 U/L    Protein, total  6.4 - 8.2 g/dL     UNABLE TO OBTAIN ACCURATE RESULTS DUE TO GROSS LIPEMIA    Albumin 3.9 3.5 - 5.0 g/dL    Globulin Cannot be calculated 2.0 - 4.0 g/dL    A-G Ratio Cannot be calculated 1.1 - 2.2     LIPASE    Collection Time: 03/30/22  7:34 PM   Result Value Ref Range    Lipase 104 73 - 393 U/L   POC VENOUS BLOOD GAS    Collection Time: 03/30/22  7:48 PM   Result Value Ref Range    pH, venous (POC) 7.23 (L) 7.32 - 7.42      pCO2, venous (POC) 28.3 (L) 41 - 51 MMHG    pO2, venous (POC) 37 25 - 40 mmHg    HCO3, venous (POC) 11.7 (L) 23.0 - 28.0 MMOL/L    sO2, venous (POC) 61.1 (L) 65 - 88 %    Base deficit, venous (POC) 14.2 mmol/L    Specimen type (POC) VENOUS BLOOD      Performed by [address] [place] [address] Road value read back KACIE    URINALYSIS W/ RFLX MICROSCOPIC    Collection Time: 03/30/22  9:14 PM   Result Value Ref Range    Color YELLOW/STRAW      Appearance CLEAR CLEAR      pH (UA) 5.0 5.0 - 8.0      Protein 30 (A) NEG mg/dL    Glucose >=1000 (A) NEG mg/dL    Ketone >=160 (A) NEG mg/dL    Bilirubin Negative NEG      Blood TRACE (A) NEG      Urobilinogen 0.2 0.2 - 1.0 EU/dL    Nitrites Negative NEG      Leukocyte Esterase Negative NEG      WBC 0-4 0 - 4 /hpf    RBC 0-5 0 - 5 /hpf    Epithelial cells FEW FEW /lpf    Bacteria Negative NEG /hpf   SAMPLES BEING HELD    Collection Time: 03/30/22  9:14 PM   Result Value Ref Range    SAMPLES BEING HELD 1UC     COMMENT        Add-on orders for these samples will be processed based on acceptable specimen integrity and analyte stability, which may vary by analyte. SAMPLES BEING HELD    Collection Time: 03/30/22  9:14 PM   Result Value Ref Range    SAMPLES BEING HELD 2RED     COMMENT        Add-on orders for these samples will be processed based on acceptable specimen integrity and analyte stability, which may vary by analyte. BETA-HYDROXYBUTYRATE    Collection Time: 03/30/22  9:34 PM   Result Value Ref Range    B-hydroxybutyrate >4.42 (H) <0.40 mmol/L   GLUCOSE, POC    Collection Time: 03/30/22 11:41 PM   Result Value Ref Range    Glucose (POC) 252 (H) 65 - 117 mg/dL    Performed by [employer]     03/30/22 1934   WBC 5.8   HGB 17.2*   HCT 46.1   *     Recent Labs     03/30/22 1934   *   K 4.0      CO2 14*   *   BUN 11   CREA 1.16   CA UNABLE TO OBTAIN ACCURATE RESULTS DUE TO GROSS LIPEMIA   ALB 3.9   TBILI 1.2*   ALT UNABLE TO OBTAIN ACCURATE RESULTS DUE TO GROSS LIPEMIA       Imaging  No results found.       Assessment and Plan DKA with new onset diabetes  -Aggressive fluid resuscitation  -A1c 10.1  -No need for insulin drip since anion gap is normal  -Start basal insulin with SSI  -Diabetic diet  -Consult diabetes education and nutritionist    Hypertension  -Start lisinopril  -Check lipids    Diet: Diabetic  Activity: As tolerated  DVT prophylaxis: Lovenox  Isolation precautions: None       Signed by:  Melodie Gil MD    March 31, 2022 at 12:42 AM

## 2022-03-31 NOTE — DIABETES MGMT
[address] [facility]  Initial   CLINICAL NURSE SPECIALIST CONSULT     Initial Presentation   Orlando Michaels is a 54 y.o. male admitted for hyperglycemia and diffuse abdominal pain. Pt seen at [facility] due to polyuria, polydipsia x 3 weeks. Pt found to have glucose in his UA and sent to ED. BG on arrival 314. A1c was 10.1%. UA showed >1000 glucose . 160 ketones. Triglycerides 1519. Co2 14. PH 7.23 Bicarb 11.7. Beta hydroxybutyrate >4.42    HX: No past medical history on file. INITIAL DX:   New onset type 2 diabetes mellitus ([facility] [address] [E11.9]     Current Treatment     TX: Insulin therapy    Consulted by Provider for advanced diabetes nursing assessment and care for:   New diagnosis    Hospital Course   Clinical progress has been complicated by . Diabetes History   No known history of diabetes Strong family history       Diabetes Medication History  Key Antihyperglycemic Medications               metFORMIN (GLUCOPHAGE) 500 mg tablet (Taking) Take 1 Tablet by mouth two (2) times daily (with meals) for 30 days. Subjective   I never been told I have diabetes. both my parents have it and my grandparents do. \" I do sit throughout the day for my job, I work from home. Pt also reports that he goes long periods of time without eating or eating snacks while eating. Objective   Physical exam  General Obese male  in no acute distress Conversant and cooperative  Neuro  Alert, oriented   Vital Signs   Visit Vitals  /71   Pulse 94   Temp 98.8 °F (37.1 °C)   Resp 18   Ht 5' 8\" (1.727 m)   Wt 93.1 kg (205 lb 4 oz)   SpO2 98%   BMI 31.21 kg/m²     Skin  Warm and dry. Heart   Regular rate and rhythm. No murmurs, rubs or gallops  Lungs  Clear to auscultation without rales or rhonchi  Extremities No foot wounds    .      Laboratory  Recent Labs     03/31/22  0237 03/30/22  1934   * 332*   AGAP 9 13   TRIGL 1,513*  --    WBC 5.3 5.8   CREA 1.04 1.16   GFRNA >60 >60   AST  --  UNABLE TO OBTAIN ACCURATE RESULTS DUE TO GROSS LIPEMIA   ALT  --  UNABLE TO OBTAIN ACCURATE RESULTS DUE TO GROSS LIPEMIA       Factors impacting BG management  Factor Dose Comments   Nutrition:  Standard meals  Tube feeding via OG/NG/PEG  TPN   ADULT DIET Regular; 4 carb choices (60 gm/meal)      Drugs:  Vasopressor load  Steroids  Epogen  Blood transfusion(s)  HIV drugs  Atypical antipsychotics  Other:            Pain     Infection     Other:   Triglycerides    1513 Pancreatitis? Blood glucose pattern    Significant diabetes-related events over the past 24-72 hours      Assessment and Plan   Nursing Diagnosis Risk for unstable blood glucose pattern   Nursing Intervention Domain 5254 Decision-making Support   Nursing Interventions Examined current inpatient diabetes/blood glucose control   Explored factors facilitating and impeding inpatient management  Explored corrective strategies with patient and responsible inpatient provider   Informed patient of rational for insulin strategy while hospitalized       Evaluation   Rip Vickers is a 54 y.o. male admitted for hyperglycemia and diffuse abdominal pain. Pt seen at [facility] due to polyuria, polydipsia x 3 weeks. Pt found to have glucose in his UA and sent to ED. BG on arrival 314. A1c was 10.1%. UA showed >1000 glucose . 160 ketones. Triglycerides 1519. Co2 14. PH 7.23 Bicarb 11.7. Beta hydroxybutyrate >4.42    1. Initial presentation of pt is classic symptoms of type 2 diabetes, pt strong family history would support this as well   2. Patient's overall clinical presentation  support pancreatitis in the presence of hypertriglyceridemia. .  Patient's triglycerides were 1513, and presenting symptoms include diffuse abdominal pain, and overall insulin resistance. CT abdomen pelvis ordered by primary team which will help guide overall treatment plan  3. Currently patient is on 19 units of Lantus and required 6 units of correction.   Patient's blood glucose pattern has been [phone] on 19 units of Lantus and 6 units of correction. Feel this is appropriate dose to start at until pancreatitis can be confirmed. Inpatient blood glucose goal will remain 100180. Continued titration will be necessary to meet this goal  4. Nursing staff will have patient perform injections to provide real-time feedback and also have patient do POC glucose in there presence, utilizing the teach back method. Appreciate their time and energy and supporting this patient's new diagnosis. Will reiterate educational points tomorrow morning. Educational packet provided to patient and  to help guide the discussion tomorrow  5. Dietitian consulted and provided education on diabetes diet, appreciate their time and energy as well  6. Discussed with patient and  about treatment plan both in hospital and for discharge planning. Discussed that pancreatitis would require insulin therapy and he could transition to oral medication in the outpatient setting  7. Further counseled patient on overall diabetes management: That included use of hemoglobin A1c, lifestyle change, follow-up with PCP  8. We will continue to monitor blood glucose patterns over the next 24 hours  9. We will follow up with patient and  in the morning to answer any questions they may have        This patient would benefit from diabetes self-management education and support [facility]) after discharge. Recommendations   1. POC glucose ACHS  2. ADULT DIET Regular; 4 carb choices (60 gm/meal)  3.      [] Use of Subcutaneous Insulin Order set (2360)  Insulin Dosing Specific recommendation   Basal                                      (Based on weight, BMI & GFR) 19 U Lantuss   if fasting blood glucose greater than 250 increase by 20%   Nutritional                                      (Based on CHO/dextrose load)     Corrective                                       (Useful in adjusting insulin dosing) [x] Normal sensitivity [x] Referral to  [x] [facility] (Phone [phone] to schedule appointment) for [facility]      Billing Code(s)   [x] 55335 IP subsequent hospital care - 35 minutes [] 78463 Prolonged Services - 65 minutes [] 81638 Prolonged Services - 110 minutes  [] 15452 IP subsequent hospital care - 25 minutes [x] 89553 Prolonged Services - 55 minutes [] 89853 Prolonged Services - 100 minutes  [] 42783 IP subsequent hospital care - 15 minutes [] 14481 Prolonged Services - 45 minutes [] 74974 Prolonged Services - 90 minutes    Before making these care recommendations, I personally reviewed the hospitalization record, including notes, laboratory & diagnostic data and current medications, and examined the patient at the bedside (circumstances permitting) before making care recommendations. More than fifty (50) percent of the time was spent in patient counseling and/or care coordination.   Total minutes: 54    RUPAL Griggs MSN, RN , ACNS-BC  Diabetes Clinical Nurse Specialist  [facility]  Access via [email or web address]

## 2022-03-31 NOTE — ED NOTES
TRANSFER - OUT REPORT:    Verbal report given to 527 nurse(name) on Felicity Gimenez  being transferred to 527(unit) for routine progression of care       Report consisted of patients Situation, Background, Assessment and   Recommendations(SBAR). Information from the following report(s) SBAR, ED Summary and MAR was reviewed with the receiving nurse. Lines:   Peripheral IV 03/30/22 Left Antecubital (Active)        Opportunity for questions and clarification was provided.       Patient transported with:   Monitor

## 2022-03-31 NOTE — ED NOTES
11:39 PM  Change of shift. Care of patient taken over from Dr Kait Fernández; H&P reviewed, bedside handoff complete. Awaiting labs, likely admission for new onset diabetes. Perfect Serve Consult for Admission  11:39 PM    ED Room Number: ER10/10  Patient Name and age:  Tosin Elliott 54 y.o.  male  Working Diagnosis:   1. Hyperglycemia    2. Diabetes mellitus, new onset ([address]    3. Metabolic acidosis        PROKI-63 Suspicion:  no  Sepsis present:  no  Reassessment needed: no  Code Status:  Full Code  Readmission: no  Isolation Requirements:  no  Recommended Level of Care:  telemetry  Department:[facility] ED - [phone]  Other:  New onset diabetes. Patient is being admitted to the hospital.  The results of their tests and reasons for their admission have been discussed with them and/or available family. They convey agreement and understanding for the need to be admitted and for their admission diagnosis.

## 2022-03-31 NOTE — DISCHARGE INSTR - DIET
Good nutrition is important when healing from an illness, injury, or surgery. Follow any nutrition recommendations given to you during your hospital stay. If you were given an oral nutrition supplement while in the hospital, continue to take this supplement at home. You can take it with meals, in-between meals, and/or before bedtime. These supplements can be purchased at most local grocery stores, pharmacies, and chain [employer]-stores. If you have any questions about your diet or nutrition, call the hospital and ask for the dietitian. Thank you for visiting with the Registered Dietitian today.   Bashir Barker , Cale Rose

## 2022-04-01 LAB
ALBUMIN SERPL-MCNC: 2.9 G/DL (ref 3.5–5)
ALBUMIN/GLOB SERPL: 1 {RATIO} (ref 1.1–2.2)
ALP SERPL-CCNC: 79 U/L (ref 45–117)
ALT SERPL-CCNC: 32 U/L (ref 12–78)
ANION GAP SERPL CALC-SCNC: 9 MMOL/L (ref 5–15)
AST SERPL-CCNC: 19 U/L (ref 15–37)
BASOPHILS # BLD: 0 K/UL (ref 0–0.1)
BASOPHILS NFR BLD: 0 % (ref 0–1)
BILIRUB SERPL-MCNC: 0.6 MG/DL (ref 0.2–1)
BUN SERPL-MCNC: 7 MG/DL (ref 6–20)
BUN/CREAT SERPL: 7 (ref 12–20)
CALCIUM SERPL-MCNC: 7.7 MG/DL (ref 8.5–10.1)
CHLORIDE SERPL-SCNC: 111 MMOL/L (ref 97–108)
CHOLEST SERPL-MCNC: 223 MG/DL
CO2 SERPL-SCNC: 18 MMOL/L (ref 21–32)
CREAT SERPL-MCNC: 0.97 MG/DL (ref 0.7–1.3)
DIFFERENTIAL METHOD BLD: ABNORMAL
EOSINOPHIL # BLD: 0.1 K/UL (ref 0–0.4)
EOSINOPHIL NFR BLD: 3 % (ref 0–7)
ERYTHROCYTE [DISTWIDTH] IN BLOOD BY AUTOMATED COUNT: 13.2 % (ref 11.5–14.5)
GAD65 AB SER-ACNC: <5 U/ML (ref 0–5)
GLOBULIN SER CALC-MCNC: 3 G/DL (ref 2–4)
GLUCOSE BLD STRIP.AUTO-MCNC: 160 MG/DL (ref 65–117)
GLUCOSE BLD STRIP.AUTO-MCNC: 209 MG/DL (ref 65–117)
GLUCOSE BLD STRIP.AUTO-MCNC: 236 MG/DL (ref 65–117)
GLUCOSE BLD STRIP.AUTO-MCNC: 239 MG/DL (ref 65–117)
GLUCOSE SERPL-MCNC: 208 MG/DL (ref 65–100)
HCT VFR BLD AUTO: 37.4 % (ref 36.6–50.3)
HDLC SERPL-MCNC: 19 MG/DL
HDLC SERPL: 11.7 {RATIO} (ref 0–5)
HGB BLD-MCNC: 13.8 G/DL (ref 12.1–17)
IMM GRANULOCYTES # BLD AUTO: 0 K/UL (ref 0–0.04)
IMM GRANULOCYTES NFR BLD AUTO: 1 % (ref 0–0.5)
INR PPP: 1.1 (ref 0.9–1.1)
LDLC SERPL CALC-MCNC: ABNORMAL MG/DL (ref 0–100)
LDLC SERPL DIRECT ASSAY-MCNC: 54 MG/DL (ref 0–100)
LYMPHOCYTES # BLD: 1.1 K/UL (ref 0.8–3.5)
LYMPHOCYTES NFR BLD: 24 % (ref 12–49)
MCH RBC QN AUTO: 32.2 PG (ref 26–34)
MCHC RBC AUTO-ENTMCNC: 36.9 G/DL (ref 30–36.5)
MCV RBC AUTO: 87.4 FL (ref 80–99)
MONOCYTES # BLD: 0.6 K/UL (ref 0–1)
MONOCYTES NFR BLD: 12 % (ref 5–13)
NEUTS SEG # BLD: 2.8 K/UL (ref 1.8–8)
NEUTS SEG NFR BLD: 60 % (ref 32–75)
NRBC # BLD: 0 K/UL (ref 0–0.01)
NRBC BLD-RTO: 0 PER 100 WBC
PLATELET # BLD AUTO: 121 K/UL (ref 150–400)
PMV BLD AUTO: 8.7 FL (ref 8.9–12.9)
POTASSIUM SERPL-SCNC: 3.1 MMOL/L (ref 3.5–5.1)
PROT SERPL-MCNC: 5.9 G/DL (ref 6.4–8.2)
PROTHROMBIN TIME: 11.1 SEC (ref 9–11.1)
RBC # BLD AUTO: 4.28 M/UL (ref 4.1–5.7)
SERVICE CMNT-IMP: ABNORMAL
SODIUM SERPL-SCNC: 138 MMOL/L (ref 136–145)
TRIGL SERPL-MCNC: 1375 MG/DL (ref ?–150)
VLDLC SERPL CALC-MCNC: ABNORMAL MG/DL
WBC # BLD AUTO: 4.7 K/UL (ref 4.1–11.1)

## 2022-04-01 PROCEDURE — 83721 ASSAY OF BLOOD LIPOPROTEIN: CPT

## 2022-04-01 PROCEDURE — 80061 LIPID PANEL: CPT

## 2022-04-01 PROCEDURE — 74011000250 HC RX REV CODE- 250: Performed by: FAMILY MEDICINE

## 2022-04-01 PROCEDURE — 74011250637 HC RX REV CODE- 250/637: Performed by: INTERNAL MEDICINE

## 2022-04-01 PROCEDURE — 74011636637 HC RX REV CODE- 636/637: Performed by: INTERNAL MEDICINE

## 2022-04-01 PROCEDURE — 74011250637 HC RX REV CODE- 250/637: Performed by: FAMILY MEDICINE

## 2022-04-01 PROCEDURE — 36415 COLL VENOUS BLD VENIPUNCTURE: CPT

## 2022-04-01 PROCEDURE — 80053 COMPREHEN METABOLIC PANEL: CPT

## 2022-04-01 PROCEDURE — 74011250636 HC RX REV CODE- 250/636: Performed by: INTERNAL MEDICINE

## 2022-04-01 PROCEDURE — 82962 GLUCOSE BLOOD TEST: CPT

## 2022-04-01 PROCEDURE — 99233 SBSQ HOSP IP/OBS HIGH 50: CPT | Performed by: CLINICAL NURSE SPECIALIST

## 2022-04-01 PROCEDURE — 65270000029 HC RM PRIVATE

## 2022-04-01 PROCEDURE — 85610 PROTHROMBIN TIME: CPT

## 2022-04-01 PROCEDURE — 74011250636 HC RX REV CODE- 250/636: Performed by: FAMILY MEDICINE

## 2022-04-01 PROCEDURE — 85025 COMPLETE CBC W/AUTO DIFF WBC: CPT

## 2022-04-01 RX ORDER — ATORVASTATIN CALCIUM 20 MG/1
40 TABLET, FILM COATED ORAL
Status: DISCONTINUED | OUTPATIENT
Start: 2022-04-01 | End: 2022-04-02 | Stop reason: HOSPADM

## 2022-04-01 RX ORDER — POTASSIUM CHLORIDE 750 MG/1
40 TABLET, FILM COATED, EXTENDED RELEASE ORAL
Status: COMPLETED | OUTPATIENT
Start: 2022-04-01 | End: 2022-04-01

## 2022-04-01 RX ADMIN — INSULIN LISPRO 4 UNITS: 100 INJECTION, SOLUTION INTRAVENOUS; SUBCUTANEOUS at 12:46

## 2022-04-01 RX ADMIN — INSULIN LISPRO 6 UNITS: 100 INJECTION, SOLUTION INTRAVENOUS; SUBCUTANEOUS at 17:01

## 2022-04-01 RX ADMIN — ENOXAPARIN SODIUM 40 MG: 40 INJECTION SUBCUTANEOUS at 09:09

## 2022-04-01 RX ADMIN — INSULIN LISPRO 4 UNITS: 100 INJECTION, SOLUTION INTRAVENOUS; SUBCUTANEOUS at 09:03

## 2022-04-01 RX ADMIN — ATORVASTATIN CALCIUM 40 MG: 20 TABLET, FILM COATED ORAL at 22:05

## 2022-04-01 RX ADMIN — LISINOPRIL 10 MG: 5 TABLET ORAL at 09:02

## 2022-04-01 RX ADMIN — SODIUM CHLORIDE, POTASSIUM CHLORIDE, SODIUM LACTATE AND CALCIUM CHLORIDE 150 ML/HR: 600; 310; 30; 20 INJECTION, SOLUTION INTRAVENOUS at 09:51

## 2022-04-01 RX ADMIN — SODIUM CHLORIDE, POTASSIUM CHLORIDE, SODIUM LACTATE AND CALCIUM CHLORIDE 150 ML/HR: 600; 310; 30; 20 INJECTION, SOLUTION INTRAVENOUS at 17:33

## 2022-04-01 RX ADMIN — POTASSIUM CHLORIDE 40 MEQ: 750 TABLET, EXTENDED RELEASE ORAL at 09:02

## 2022-04-01 RX ADMIN — SODIUM CHLORIDE, PRESERVATIVE FREE 10 ML: 5 INJECTION INTRAVENOUS at 22:07

## 2022-04-01 RX ADMIN — SODIUM CHLORIDE, POTASSIUM CHLORIDE, SODIUM LACTATE AND CALCIUM CHLORIDE 150 ML/HR: 600; 310; 30; 20 INJECTION, SOLUTION INTRAVENOUS at 01:47

## 2022-04-01 RX ADMIN — FENOFIBRATE 145 MG: 145 TABLET ORAL at 09:02

## 2022-04-01 RX ADMIN — INSULIN GLARGINE 28 UNITS: 100 INJECTION, SOLUTION SUBCUTANEOUS at 09:04

## 2022-04-01 NOTE — PROGRESS NOTES
4/1/2022   CARE MANAGEMENT NOTE:  CM reviewed EMR and handoff received from previous  (Tati Bojorquez). Pt was admitted with new onset DM. Reportedly, pt resides with spouse Rina Gallagher [phone]). RUR 5%    Transition Plan of Care:  1. Plan is for pt to return home   2. Outpt f/u  3. Pt will arrange his own transportation home    CM will continue to follow pt until discharged.   Ailin

## 2022-04-01 NOTE — PROGRESS NOTES
Juan David Frazier [facility]  [address] [facility], [place], [address] [place] [address]  [phone]      Medical Progress Note      NAME: Greta Lyman   :  1970  MRM:  [id number]    Date/Time: 2022           Assessment / Plan:     #DKA/HHS: Atypical presentation without anion gap but did have elevated bOHb and ketonuria. BGs in 300s. Responded to basal bolus insulin and did not require gtt. A1c is 10.1% and has had weight loss, poor po intake so starvation ketosis possible. Also of note is elevated TGs to 1500 so do query chronic pancreatitis leading to endocrine dysfx. Does not drink EtOH.    - Increase Glargine to 0.3u/kg + increase correctional SSI   - LR @ 150cc/hr   - Start metformin post contrast washout   - Appreciate Endo/DM recs    #HyperT,513 here. Lipase is normal, but he has e/o fat stranding on imaging. Does have hypocalcemia but no organ failure and reassuring vitals and exam. Therefore does not need PLEX. Will also hold on insulin gtt, but monitor with insulin increases as above. TGs slightly improved as is clinical exam, so have held on insulin gtt. If worsens in any way will need to start. Goal BG < 180   - Insulin as noted   - Tx pancreatitis   - Started fibrate 3/31   - Start statin   - Lipids tmr   - Low threshold insulin gtt vs plex    #Acute Pancreatitis: Normal lipase, but hypocalcemia and clinical hx concerning. CT w/ mild stranding. Suspect 2/2 hyperTG as noted above   - LR   - ADAT, low fat diet    #NAGMA: Bicarb low, suspect 2/2 GI losses vs RTA vs NS.  Improving   -  LR   - Trend    #Hypocalcemia: Possibly 2/2 saponification   - Trend    #ALMAZAN: Does have thrombocytopenia so will check coags to consider cirrhosis    #HTNL Lisin 10mg, new                     Care Plan discussed with: Patient    Discussed:  Care Plan    Prophylaxis:  Lovenox    Disposition:  Home w/Family           ___________________________________________________    Attending Physician: Dary Vázquez, MD        Subjective:     Chief Complaint:  Susan Dominguez. Feels very good today, no abdominal pain    ROS:  (bold if positive, if negative)    Tolerating PT  Tolerating Diet          Objective:       Vitals:          Last 24hrs VS reviewed since prior progress note.  Most recent are:    Visit Vitals  BP (!) 142/77 (BP 1 Location: Right upper arm, BP Patient Position: At rest)   Pulse 82   Temp 98.9 °F (37.2 °C)   Resp 18   Ht 5' 8\" (1.727 m)   Wt 93.1 kg (205 lb 4 oz)   SpO2 96%   BMI 31.21 kg/m²     SpO2 Readings from Last 6 Encounters:   04/01/22 96%          No intake or output data in the 24 hours ending 04/01/22 1417       Exam:     Physical Exam:    Gen:  Well-developed, well-nourished, in no acute distress  HEENT:  Pink conjunctivae, PERRL, hearing intact to voice, moist mucous membranes  Neck:  Supple, without masses, thyroid non-tender  Resp:  No accessory muscle use, clear breath sounds without wheezes rales or rhonchi  Card:  No murmurs, normal S1, S2 without thrills, bruits or peripheral edema  Abd:  Soft, non-tender, non-distended, normoactive bowel sounds are present  Musc:  No cyanosis or clubbing  Skin:  No rashes or ulcers, skin turgor is good  Neuro:  Cranial nerves 3-12 are grossly intact,  strength is 5/5 bilaterally and dorsi / plantarflexion is 5/5 bilaterally, follows commands appropriately  Psych:  Good insight, oriented to person, place and time, alert    Medications Reviewed: (see below)    Lab Data Reviewed: (see below)    ______________________________________________________________________    Medications:     Current Facility-Administered Medications   Medication Dose Route Frequency    sodium chloride (NS) flush 5-40 mL  5-40 mL IntraVENous Q8H    sodium chloride (NS) flush 5-40 mL  5-40 mL IntraVENous PRN    acetaminophen (TYLENOL) tablet 650 mg  650 mg Oral Q6H PRN    Or    acetaminophen (TYLENOL) suppository 650 mg  650 mg Rectal Q6H PRN    polyethylene glycol (MIRALAX) packet 17 g 17 g Oral DAILY PRN    ondansetron (ZOFRAN ODT) tablet 4 mg  4 mg Oral Q8H PRN    Or    ondansetron (ZOFRAN) injection 4 mg  4 mg IntraVENous Q6H PRN    enoxaparin (LOVENOX) injection 40 mg  40 mg SubCUTAneous DAILY    glucose chewable tablet 16 g  4 Tablet Oral PRN    dextrose 10% infusion 0-250 mL  0-250 mL IntraVENous PRN    glucagon (GLUCAGEN) injection 1 mg  1 mg IntraMUSCular PRN    lisinopriL (PRINIVIL, ZESTRIL) tablet 10 mg  10 mg Oral DAILY    fenofibrate nanocrystallized (TRICOR) tablet 145 mg  145 mg Oral DAILY    lactated Ringers infusion  150 mL/hr IntraVENous CONTINUOUS    [START ON 4/2/2022] metFORMIN (GLUCOPHAGE) tablet 500 mg  500 mg Oral BID WITH MEALS    insulin glargine (LANTUS) injection 28 Units  0.3 Units/kg SubCUTAneous DAILY    insulin lispro (HUMALOG) injection   SubCUTAneous AC&HS    morphine injection 2 mg  2 mg IntraVENous Q3H PRN            Lab Review:     Recent Labs     04/01/22 0141 03/31/22 0237 03/30/22 1934   WBC 4.7 5.3 5.8   HGB 13.8 14.4 17.2*   HCT 37.4 39.4 46.1   * 115* 146*     Recent Labs     04/01/22 0141 03/31/22 0237 03/30/22 1934    137 130*   K 3.1* 3.5 4.0   * 113* 103   CO2 18* 15* 14*   * 266* 332*   BUN 7 10 11   CREA 0.97 1.04 1.16   CA 7.7* 7.0* UNABLE TO OBTAIN ACCURATE RESULTS DUE TO GROSS LIPEMIA   ALB 2.9*  --  3.9   ALT 32  --  UNABLE TO OBTAIN ACCURATE RESULTS DUE TO GROSS LIPEMIA   INR 1.1  --   --      No components found for: [place]

## 2022-04-01 NOTE — DIABETES MGMT
[facility]  Follow Up   CLINICAL NURSE SPECIALIST CONSULT     Initial Presentation   Cece Yap is a 54 y.o. male admitted for hyperglycemia and diffuse abdominal pain. Pt seen at [facility] due to polyuria, polydipsia x 3 weeks. Pt found to have glucose in his UA and sent to ED. BG on arrival 314. A1c was 10.1%. UA showed >1000 glucose . 160 ketones. Triglycerides 1519. Co2 14. PH 7.23 Bicarb 11.7. Beta hydroxybutyrate >4.42    HX: No past medical history on file. INITIAL DX:   New onset type 2 diabetes mellitus ([facility] [address] [E11.9]     Current Treatment     TX: Insulin therapy    Consulted by Provider for advanced diabetes nursing assessment and care for:   New diagnosis    Hospital Course   Clinical progress has been complicated by . Diabetes History   No known history of diabetes Strong family history       Diabetes Medication History  Key Antihyperglycemic Medications             metFORMIN (GLUCOPHAGE) 500 mg tablet (Taking) Take 1 Tablet by mouth two (2) times daily (with meals) for 30 days. Subjective   I never been told I have diabetes. both my parents have it and my grandparents do. \" I do sit throughout the day for my job, I work from home. Pt also reports that he goes long periods of time without eating or eating snacks while eating. Objective   Physical exam  General Obese male  in no acute distress Conversant and cooperative  Neuro  Alert, oriented   Vital Signs   Visit Vitals  /77 (BP 1 Location: Right arm, BP Patient Position: At rest)   Pulse 81   Temp 98.9 °F (37.2 °C)   Resp 18   Ht 5' 8\" (1.727 m)   Wt 93.1 kg (205 lb 4 oz)   SpO2 97%   BMI 31.21 kg/m²     Skin  Warm and dry. Heart   Regular rate and rhythm. No murmurs, rubs or gallops  Lungs  Clear to auscultation without rales or rhonchi  Extremities No foot wounds    .      Laboratory  Recent Labs     04/01/22  0141 03/31/22  0237 03/30/22  1934   * 266* 332*   AGAP 9 9 13   TRIGL  -- 1,513*  --    WBC 4.7 5.3 5.8   CREA 0.97 1.04 1.16   GFRNA >60 >60 >60   AST 19  --  UNABLE TO OBTAIN ACCURATE RESULTS DUE TO GROSS LIPEMIA   ALT 32  --  UNABLE TO OBTAIN ACCURATE RESULTS DUE TO GROSS LIPEMIA   Ct results  IMPRESSION  1. Mild peripancreatic inflammatory fat stranding indicative of pancreatitis. 2.  No pancreatic necrosis, duct dilation, or peripancreatic fluid collection. 3.  Hepatic steatosis. Factors impacting BG management  Factor Dose Comments   Nutrition:  Standard meals  Tube feeding via OG/NG/PEG  TPN   ADULT DIET Regular; 4 carb choices (60 gm/meal); Low Fat (Less than or Equal to 50 gm/day)      Drugs:  Vasopressor load  Steroids  Epogen  Blood transfusion(s)  HIV drugs  Atypical antipsychotics  Other:            Pain     Infection     Other:   Triglycerides    1513 Suggested pancreatitis on Ct scan     Blood glucose pattern    Significant diabetes-related events over the past 24-72 hours      Assessment and Plan   Nursing Diagnosis Risk for unstable blood glucose pattern   Nursing Intervention Domain 5250 Decision-making Support   Nursing Interventions Examined current inpatient diabetes/blood glucose control   Explored factors facilitating and impeding inpatient management  Explored corrective strategies with patient and responsible inpatient provider   Informed patient of rational for insulin strategy while hospitalized       Evaluation   Orlando Michaels is a 54 y.o. male admitted for hyperglycemia and diffuse abdominal pain. Pt seen at [facility] due to polyuria, polydipsia x 3 weeks. Pt found to have glucose in his UA and sent to ED. BG on arrival 314. A1c was 10.1%. UA showed >1000 glucose . 160 ketones. Triglycerides 1519. Co2 14. PH 7.23 Bicarb 11.7. Beta hydroxybutyrate >4.42    1. Initial presentation of pt is classic symptoms of type 2 diabetes, pt strong family history would support this as well   2.   Patient's overall clinical presentation  support pancreatitis in the presence of hypertriglyceridemia. .  Patient's triglycerides were 1513, and presenting symptoms include diffuse abdominal pain, and overall insulin resistance. CT abdomen pelvis ordered by primary team which will help guide overall treatment plan  3. Currently patient is on 19 units of Lantus and required 6 units of correction. Patient's blood glucose pattern has been 222-332 on 19 units of Lantus and 6 units of correction. Feel this is appropriate dose to start at until pancreatitis can be confirmed. Inpatient blood glucose goal will remain 100180. Continued titration will be necessary to meet this goal  4. Nursing staff will have patient perform injections to provide real-time feedback and also have patient do POC glucose in there presence, utilizing the teach back method. Appreciate their time and energy and supporting this patient's new diagnosis. Will reiterate educational points tomorrow morning. Educational packet provided to patient and  to help guide the discussion tomorrow  5. Dietitian consulted and provided education on diabetes diet, appreciate their time and energy as well  6. Discussed with patient and  about treatment plan both in hospital and for discharge planning. Discussed that pancreatitis would require insulin therapy and he could transition to oral medication in the outpatient setting  7. Further counseled patient on overall diabetes management: That included use of hemoglobin A1c, lifestyle change, follow-up with PCP  8. We will continue to monitor blood glucose patterns over the next 24 hours  9. We will follow up with patient and  in the morning to answer any questions they may have    04/01  1. Pt doing well today,  present during visit  2. CT scan was suggestive of pancreatitis. Clinical picture suggest this to be from the hypertriglyceridemia as suspected.  Primary team started patient on fenofibrate and increased Lantus to 28 U Lantus with correction insulin   3. Very appropriate at this time, insulin infusion may be necessary, however given his overall clinical improvement increase Lantuss would suffice  4. Pt BG patterns have been 205-221 on 19 U of lantus and 11 U of correction insulin, pt remains out of goal of 100-180.  5. Pt received contrast yesterday and will require 48 hours prior to initiation of metformin  Metformin should be temporarily held for 48 hours after IV Contrast administration AND normal renal function has been confirmed. Based on your site's Metformin policy either document a MAR Action of \"HELD\" or call the Ordering Provider. ( recommendation)    RX IV CONTRAST (48h ago, onward)     The 5 most recent administrations since 03/30/2022 are shown below each listed medication. iopamidol       Order Route Dose Action Date     iopamidoL (ISOVUE-370) 76 % injection 100 mL IntraVENous 100 mL Given 03/31/2022             6. Nursing has continued to work with patient at the bedside by letting him inject insulin in there presence. Appreciate there efforts   7. Following education provided to patient and  at the bedside. All questions were addressed during time of visit. Encouraged them to reach out to me if further questions arise prior to discharge             Diabetes Education Checklist    Pathophysiology  [x] Diabetes basics  [x] Differences between type 1 and type 2    Diagnostic Criteria  [x] Interpretation of Labs  [x] Hemoglobin A1c  [x] Blood glucose goals  Notes: Goal A1C 7%, patients A1C _10.1%___  Goal glucose under 200    Blood Glucose Monitoring  [x] Using a glucometer  [x] When to check BG  [x] Record keeping  Notes: Patient simulated obtaining a blood glucose with nursing staff. Discussed they will need to obtain a meter, lancets and strips.   Patient instructed to obtain a glucose reading before meals and bedtime and if the \"don't feel right\"  Patient to create a log of blood sugar readings and present to their PCP for long term management. Insulin  [x] Long-acting insulin  [x] Using insulin pens / vials  [x] Injection sites & site rotation  [x] Proper storage  [x] Insulin expiration guidelines  [x] Sharps disposal  Notes: Discussed the difference between long acting and short acting insulin  Used an insulin pen demo and simulated an insulin injection      Hypoglycemia  [x] Signs & symptoms  [x] Rule of 15 and other treatment  Notes: If patient feels dizzy, light headed or \"woozy\" patient to obtain a blood glucose reading. If blood glucose is under 70, patient instructed to drink 4-6 oz of milk, juice or regular soda then recheck 15 minutes later. If glucose under 70, repeat with another 4-6 oz regular juice/soda/milk. Once glucose over 70, eat a 15 gram snack like 1/2 peanut butter sandwich or meal.      Hyperglycemia  [x] Signs & symptoms    Physical Activity & Exercise  [x] Impact on BG levels  [x] BG targets for physical activity  [x] When to avoid physical activity  Notes: Patient instructed to increase activity every week once stabilized at home. Activity can consist of walking, chores around his house, garden, cut grass. Nutrition Basics  [x] Starter tips for meal planning  [x] Meal & snack schedule  [x] Reading food labels  [x] Balanced plate method  [x] How different foods impact BG levels  [x] Carbohydrate food sources  [x] Carbohydrate counting        [x] Low carb meal & snack options  Notes:     Reducing Risks  [x] Long-term complications of diabetes  [x] Health Maintenance  [x] Healthy coping     Recommendations   1. POC glucose ACHS  ADULT DIET Regular; 4 carb choices (60 gm/meal); Low Fat (Less than or Equal to 50 gm/day)  2.      [] Use of Subcutaneous Insulin Order set (8049)  Insulin Dosing Specific recommendation   Basal                                      (Based on weight, BMI & GFR) 28 U Paul Agree continue   Nutritional                                      (Based on CHO/dextrose load)     Corrective                                       (Useful in adjusting insulin dosing) [x] Normal sensitivity Agree continue         [x] Referral to  [x] [facility] (Phone [phone] to schedule appointment) for [facility]      Billing Code(s)   [x] 55832 IP subsequent hospital care - 35 minutes [] 68442 Prolonged Services - 65 minutes [] 24847 Prolonged Services - 110 minutes  [] 25997 IP subsequent hospital care - 25 minutes [x] 99432 Prolonged Services - 55 minutes [] 34643 Prolonged Services - 100 minutes  [] 87870 IP subsequent hospital care - 15 minutes [] 54249 Prolonged Services - 45 minutes [] 18086 Prolonged Services - 90 minutes    Before making these care recommendations, I personally reviewed the hospitalization record, including notes, laboratory & diagnostic data and current medications, and examined the patient at the bedside (circumstances permitting) before making care recommendations. More than fifty (50) percent of the time was spent in patient counseling and/or care coordination.   Total minutes: 54    RUPAL Colon MSN, RN , ACNS-BC  Diabetes Clinical Nurse Specialist  [facility]  Access via [address] [place] [address]

## 2022-04-01 NOTE — PROGRESS NOTES
Bedside and Verbal shift change report given to [facility] (oncoming nurse) by Sandra Atkins (offgoing nurse). Report included the following information SBAR, Kardex, Procedure Summary, Intake/Output, MAR and Recent Results.

## 2022-04-02 VITALS
BODY MASS INDEX: 31.11 KG/M2 | WEIGHT: 205.25 LBS | HEIGHT: 68 IN | SYSTOLIC BLOOD PRESSURE: 130 MMHG | RESPIRATION RATE: 16 BRPM | TEMPERATURE: 98.7 F | DIASTOLIC BLOOD PRESSURE: 76 MMHG | OXYGEN SATURATION: 97 % | HEART RATE: 81 BPM

## 2022-04-02 LAB
ALBUMIN SERPL-MCNC: 2.8 G/DL (ref 3.5–5)
ALBUMIN/GLOB SERPL: 0.9 {RATIO} (ref 1.1–2.2)
ALP SERPL-CCNC: 75 U/L (ref 45–117)
ALT SERPL-CCNC: 27 U/L (ref 12–78)
ANION GAP SERPL CALC-SCNC: 11 MMOL/L (ref 5–15)
AST SERPL-CCNC: 14 U/L (ref 15–37)
BASOPHILS # BLD: 0 K/UL (ref 0–0.1)
BASOPHILS NFR BLD: 1 % (ref 0–1)
BILIRUB SERPL-MCNC: 0.7 MG/DL (ref 0.2–1)
BUN SERPL-MCNC: 7 MG/DL (ref 6–20)
BUN/CREAT SERPL: 9 (ref 12–20)
CALCIUM SERPL-MCNC: 8.3 MG/DL (ref 8.5–10.1)
CHLORIDE SERPL-SCNC: 108 MMOL/L (ref 97–108)
CHOLEST SERPL-MCNC: 198 MG/DL
CO2 SERPL-SCNC: 20 MMOL/L (ref 21–32)
CREAT SERPL-MCNC: 0.78 MG/DL (ref 0.7–1.3)
DIFFERENTIAL METHOD BLD: ABNORMAL
EOSINOPHIL # BLD: 0.1 K/UL (ref 0–0.4)
EOSINOPHIL NFR BLD: 3 % (ref 0–7)
ERYTHROCYTE [DISTWIDTH] IN BLOOD BY AUTOMATED COUNT: 13.2 % (ref 11.5–14.5)
GLOBULIN SER CALC-MCNC: 3 G/DL (ref 2–4)
GLUCOSE BLD STRIP.AUTO-MCNC: 157 MG/DL (ref 65–117)
GLUCOSE BLD STRIP.AUTO-MCNC: 172 MG/DL (ref 65–117)
GLUCOSE SERPL-MCNC: 187 MG/DL (ref 65–100)
HCT VFR BLD AUTO: 37.3 % (ref 36.6–50.3)
HDLC SERPL-MCNC: 16 MG/DL
HDLC SERPL: 12.4 {RATIO} (ref 0–5)
HGB BLD-MCNC: 13.5 G/DL (ref 12.1–17)
IMM GRANULOCYTES # BLD AUTO: 0 K/UL (ref 0–0.04)
IMM GRANULOCYTES NFR BLD AUTO: 1 % (ref 0–0.5)
LDLC SERPL CALC-MCNC: ABNORMAL MG/DL (ref 0–100)
LDLC SERPL DIRECT ASSAY-MCNC: 51 MG/DL (ref 0–100)
LYMPHOCYTES # BLD: 1 K/UL (ref 0.8–3.5)
LYMPHOCYTES NFR BLD: 33 % (ref 12–49)
MCH RBC QN AUTO: 31.8 PG (ref 26–34)
MCHC RBC AUTO-ENTMCNC: 36.2 G/DL (ref 30–36.5)
MCV RBC AUTO: 87.8 FL (ref 80–99)
MONOCYTES # BLD: 0.4 K/UL (ref 0–1)
MONOCYTES NFR BLD: 13 % (ref 5–13)
NEUTS SEG # BLD: 1.6 K/UL (ref 1.8–8)
NEUTS SEG NFR BLD: 50 % (ref 32–75)
NRBC # BLD: 0 K/UL (ref 0–0.01)
NRBC BLD-RTO: 0 PER 100 WBC
PLATELET # BLD AUTO: 92 K/UL (ref 150–400)
PMV BLD AUTO: 9 FL (ref 8.9–12.9)
POTASSIUM SERPL-SCNC: 3 MMOL/L (ref 3.5–5.1)
PROT SERPL-MCNC: 5.8 G/DL (ref 6.4–8.2)
RBC # BLD AUTO: 4.25 M/UL (ref 4.1–5.7)
SERVICE CMNT-IMP: ABNORMAL
SERVICE CMNT-IMP: ABNORMAL
SODIUM SERPL-SCNC: 139 MMOL/L (ref 136–145)
TRIGL SERPL-MCNC: 805 MG/DL (ref ?–150)
VLDLC SERPL CALC-MCNC: ABNORMAL MG/DL
WBC # BLD AUTO: 3.1 K/UL (ref 4.1–11.1)

## 2022-04-02 PROCEDURE — 74011250636 HC RX REV CODE- 250/636: Performed by: INTERNAL MEDICINE

## 2022-04-02 PROCEDURE — 83721 ASSAY OF BLOOD LIPOPROTEIN: CPT

## 2022-04-02 PROCEDURE — 74011636637 HC RX REV CODE- 636/637: Performed by: INTERNAL MEDICINE

## 2022-04-02 PROCEDURE — 80061 LIPID PANEL: CPT

## 2022-04-02 PROCEDURE — 85025 COMPLETE CBC W/AUTO DIFF WBC: CPT

## 2022-04-02 PROCEDURE — 36415 COLL VENOUS BLD VENIPUNCTURE: CPT

## 2022-04-02 PROCEDURE — 74011250636 HC RX REV CODE- 250/636: Performed by: FAMILY MEDICINE

## 2022-04-02 PROCEDURE — 74011250637 HC RX REV CODE- 250/637: Performed by: INTERNAL MEDICINE

## 2022-04-02 PROCEDURE — 74011250637 HC RX REV CODE- 250/637: Performed by: FAMILY MEDICINE

## 2022-04-02 PROCEDURE — 74011000250 HC RX REV CODE- 250: Performed by: FAMILY MEDICINE

## 2022-04-02 PROCEDURE — 80053 COMPREHEN METABOLIC PANEL: CPT

## 2022-04-02 PROCEDURE — 82962 GLUCOSE BLOOD TEST: CPT

## 2022-04-02 RX ORDER — MAGNESIUM SULFATE HEPTAHYDRATE 40 MG/ML
2 INJECTION, SOLUTION INTRAVENOUS ONCE
Status: COMPLETED | OUTPATIENT
Start: 2022-04-02 | End: 2022-04-02

## 2022-04-02 RX ORDER — LISINOPRIL 10 MG/1
10 TABLET ORAL DAILY
Qty: 30 TABLET | Refills: 0 | Status: SHIPPED | OUTPATIENT
Start: 2022-04-03 | End: 2022-05-03

## 2022-04-02 RX ORDER — METFORMIN HYDROCHLORIDE 500 MG/1
500 TABLET ORAL 2 TIMES DAILY WITH MEALS
Qty: 60 TABLET | Refills: 0 | Status: SHIPPED | OUTPATIENT
Start: 2022-04-02 | End: 2022-05-02

## 2022-04-02 RX ORDER — POTASSIUM CHLORIDE 750 MG/1
40 TABLET, FILM COATED, EXTENDED RELEASE ORAL
Status: COMPLETED | OUTPATIENT
Start: 2022-04-02 | End: 2022-04-02

## 2022-04-02 RX ORDER — ATORVASTATIN CALCIUM 40 MG/1
40 TABLET, FILM COATED ORAL
Qty: 30 TABLET | Refills: 0 | Status: SHIPPED | OUTPATIENT
Start: 2022-04-02 | End: 2022-05-02

## 2022-04-02 RX ORDER — FENOFIBRATE 145 MG/1
145 TABLET, COATED ORAL DAILY
Qty: 30 TABLET | Refills: 0 | Status: SHIPPED | OUTPATIENT
Start: 2022-04-03 | End: 2022-05-03

## 2022-04-02 RX ORDER — INSULIN GLARGINE 100 [IU]/ML
25 INJECTION, SOLUTION SUBCUTANEOUS DAILY
Qty: 7 ML | Refills: 1 | Status: SHIPPED | OUTPATIENT
Start: 2022-04-02 | End: 2022-04-30

## 2022-04-02 RX ADMIN — ENOXAPARIN SODIUM 40 MG: 40 INJECTION SUBCUTANEOUS at 09:18

## 2022-04-02 RX ADMIN — SODIUM CHLORIDE, POTASSIUM CHLORIDE, SODIUM LACTATE AND CALCIUM CHLORIDE 150 ML/HR: 600; 310; 30; 20 INJECTION, SOLUTION INTRAVENOUS at 07:42

## 2022-04-02 RX ADMIN — POTASSIUM CHLORIDE 40 MEQ: 750 TABLET, EXTENDED RELEASE ORAL at 09:19

## 2022-04-02 RX ADMIN — INSULIN LISPRO 5 UNITS: 100 INJECTION, SOLUTION INTRAVENOUS; SUBCUTANEOUS at 12:05

## 2022-04-02 RX ADMIN — SODIUM CHLORIDE, PRESERVATIVE FREE 10 ML: 5 INJECTION INTRAVENOUS at 06:56

## 2022-04-02 RX ADMIN — MAGNESIUM SULFATE HEPTAHYDRATE 2 G: 40 INJECTION, SOLUTION INTRAVENOUS at 09:18

## 2022-04-02 RX ADMIN — SODIUM CHLORIDE, PRESERVATIVE FREE 10 ML: 5 INJECTION INTRAVENOUS at 12:06

## 2022-04-02 RX ADMIN — INSULIN LISPRO 5 UNITS: 100 INJECTION, SOLUTION INTRAVENOUS; SUBCUTANEOUS at 09:18

## 2022-04-02 RX ADMIN — FENOFIBRATE 145 MG: 145 TABLET ORAL at 09:19

## 2022-04-02 RX ADMIN — LISINOPRIL 10 MG: 5 TABLET ORAL at 09:19

## 2022-04-02 RX ADMIN — INSULIN GLARGINE 28 UNITS: 100 INJECTION, SOLUTION SUBCUTANEOUS at 09:18

## 2022-04-02 RX ADMIN — SODIUM CHLORIDE, POTASSIUM CHLORIDE, SODIUM LACTATE AND CALCIUM CHLORIDE 150 ML/HR: 600; 310; 30; 20 INJECTION, SOLUTION INTRAVENOUS at 00:40

## 2022-04-02 NOTE — PROGRESS NOTES
Problem: Diabetes Self-Management  Goal: *Disease process and treatment process  Description: Define diabetes and identify own type of diabetes; list 3 options for treating diabetes. Outcome: Progressing Towards Goal  Goal: *Incorporating nutritional management into lifestyle  Description: Describe effect of type, amount and timing of food on blood glucose; list 3 methods for planning meals. Outcome: Progressing Towards Goal  Goal: *Incorporating physical activity into lifestyle  Description: State effect of exercise on blood glucose levels. Outcome: Progressing Towards Goal  Goal: *Developing strategies to promote health/change behavior  Description: Define the ABC's of diabetes; identify appropriate screenings, schedule and personal plan for screenings. Outcome: Progressing Towards Goal  Goal: *Using medications safely  Description: State effect of diabetes medications on diabetes; name diabetes medication taking, action and side effects. Outcome: Progressing Towards Goal  Goal: *Monitoring blood glucose, interpreting and using results  Description: Identify recommended blood glucose targets  and personal targets. Outcome: Progressing Towards Goal  Goal: *Prevention, detection, treatment of acute complications  Description: List symptoms of hyper- and hypoglycemia; describe how to treat low blood sugar and actions for lowering  high blood glucose level. Outcome: Progressing Towards Goal  Goal: *Prevention, detection and treatment of chronic complications  Description: Define the natural course of diabetes and describe the relationship of blood glucose levels to long term complications of diabetes.   Outcome: Progressing Towards Goal  Goal: *Developing strategies to address psychosocial issues  Description: Describe feelings about living with diabetes; identify support needed and support network  Outcome: Progressing Towards Goal  Goal: *Insulin pump training  Outcome: Progressing Towards Goal  Goal: *Sick day guidelines  Outcome: Progressing Towards Goal  Goal: *Patient Specific Goal (EDIT GOAL, INSERT TEXT)  Outcome: Progressing Towards Goal     Problem: Diabetes Self-Management  Goal: *Incorporating nutritional management into lifestyle  Description: Describe effect of type, amount and timing of food on blood glucose; list 3 methods for planning meals. Outcome: Progressing Towards Goal     Problem: Falls - Risk of  Goal: *Absence of Falls  Description: Document [place] Fall Risk and appropriate interventions in the flowsheet.   Outcome: Progressing Towards Goal  Note: Fall Risk Interventions:            Medication Interventions: Patient to call before getting OOB,Teach patient to arise slowly         History of Falls Interventions: Utilize gait belt for transfer/ambulation         Problem: Patient Education: Go to Patient Education Activity  Goal: Patient/Family Education  Outcome: Progressing Towards Goal

## 2022-04-02 NOTE — DISCHARGE INSTRUCTIONS
HOSPITALIST DISCHARGE INSTRUCTIONS  NAME: Jin Maria   :  1970   MRN:  [id number]     Date/Time:  2022 3:31 PM    ADMIT DATE: 3/30/2022     DISCHARGE DATE: 2022     ADMITTING DIAGNOSIS:  DKA  Pancreatitis  Hypertriglyceridemia   Hypertension    DISCHARGE DIAGNOSIS:  You were admitted for evaluation and treatment of the above. You likely developed pancreatitis due to high levels of triglyceride in your blood. This could be hereditary. You were also found to have diabetes, which could have been chronic and then exacerbated by pancreatitis. You responded well to IV fluids and insulin. You will discharge on several new medicines: For your diabetes, you will need to start taking insulin glargine 25 units once daily in the morning as well as metformin 500mg tablet twice daily    For your high triglycerides, you will need to start taking fenofibrate (tricor) once daily and atorvastatin (Lipitor) once daily    For your hypertension, you will need to start taking lisinopril once daily    MEDICATIONS:    · It is important that you take the medication exactly as they are prescribed. · Keep your medication in the bottles provided by the pharmacist and keep a list of the medication names, dosages, and times to be taken in your wallet. · Do not take other medications without consulting your doctor. If you experience any of the following symptoms then please call your primary care physician or return to the emergency room if you cannot get hold of your doctor:  Fever, chills, nausea, vomiting, diarrhea, change in mentation, falling, bleeding, shortness of breath      Information obtained by :  I understand that if any problems occur once I am at home I am to contact my physician. I understand and acknowledge receipt of the instructions indicated above.                                                                                                                                            [facility] or R.N.'s Signature                                                                  Date/Time                                                                                                                                              Patient or Representative Signature                                                          Date/Time

## 2022-04-02 NOTE — PROGRESS NOTES
Problem: Falls - Risk of  Goal: *Absence of Falls  Description: Document Shelbie Bynum Fall Risk and appropriate interventions in the flowsheet.   Outcome: Progressing Towards Goal  Note: Fall Risk Interventions:            Medication Interventions: Patient to call before getting OOB,Teach patient to arise slowly         History of Falls Interventions: Utilize gait belt for transfer/ambulation

## 2022-04-02 NOTE — DISCHARGE SUMMARY
Physician Discharge Summary     Patient ID:  Ernesto Gutierrez  [id number]  86 y.o.  1970    Admit date: 3/30/2022    Discharge date and time: 2022    Admission Diagnoses: New onset type 2 diabetes mellitus ([facility] [E11.9]    Discharge Diagnoses: Active Problems:    New onset type 2 diabetes mellitus ([facility] [address] (3/31/2022)           Hospital Course:   Ernesto Gutierrez is a 54 y.o. male who presented with hyperglycemia. He was seen at [facility] earlier today due to frequent urination, dry mouth, nausea and polydipsia for the past 3 weeks that have been progressively worsening. He was advised to go to the ER for further management after having glucose in his urine. He has a strong family history of diabetes. He has some nonspecific abdominal pain. No diarrhea or nausea/vomiting. No fevers. He was admitted for further evaluation and treatment of the following:        #DKA/HHS: Atypical presentation without anion gap but did have elevated bOHb and ketonuria. BGs in 300s. Responded to basal bolus insulin and did not require gtt. A1c is 10.1% and has had weight loss, poor po intake so starvation ketosis possible. Also of note is elevated TGs to 1500 so do query chronic pancreatitis leading to endocrine dysfx. Does not drink EtOH. He met with Dm educator and will dc on glargine 25u daily + metformin 500mg BID, which can be further uptitrated. Quite motivated to make dietary changes.           #HyperT,513 here on admit. Lipase is normal, but he had e/o fat stranding on imaging. Does have hypocalcemia but no organ failure and reassuring vitals and exam. Therefore did not need PLEX. Did also hold on insulin gtt, but monitored with insulin SQ basl bolus. TGs improved to 800 and downtrending. Clinica picture much improve,d tolerating diet. He discharges on insulin, fenofibrate, and atorvastatin 40mg.      #Acute Pancreatitis: Normal lipase, but hypocalcemia and clinical hx concerning. CT w/ mild stranding.  Suspect 2/2 hyperTG as noted above. Potentially chronic in nature. Tolerating low fat diet at dc.     #NAGMA: Bicarb low, suspect 2/2 GI losses vs RTA vs NS. Improved            #Hypocalcemia: Possibly 2/2 saponification. Improved     #ALMAZAN: Does have thrombocytopenia so did check coags to consider cirrhosis, but INR was normal. Rec weight loss.      #HTN: Lisin 10mg, new medicine for him      PCP: None     Consults: Endocrine    Condition of patient at discharge: good and improved    Discharge Exam:    Physical Exam:    Gen: Well-developed, well-nourished, in no acute distress  HEENT:  Pink conjunctivae, PERRL, hearing intact to voice, moist mucous membranes  Neck: Supple, without masses, thyroid non-tender  Resp: No accessory muscle use, clear breath sounds without wheezes rales or rhonchi  Card: No murmurs, normal S1, S2 without thrills, bruits or peripheral edema  Abd:  Soft, non-tender, non-distended, normoactive bowel sounds are present, no palpable organomegaly and no detectable hernias  Lymph:  No cervical or inguinal adenopathy  Musc: No cyanosis or clubbing  Skin: No rashes or ulcers, skin turgor is good  Neuro:  Cranial nerves are grossly intact, no focal motor weakness, follows commands appropriately  Psych:  Good insight, oriented to person, place and time, alert          Disposition: home    Patient Instructions:   Current Discharge Medication List      START taking these medications    Details   atorvastatin (LIPITOR) 40 mg tablet Take 1 Tablet by mouth nightly for 30 days. Qty: 30 Tablet, Refills: 0      fenofibrate nanocrystallized (TRICOR) 145 mg tablet Take 1 Tablet by mouth daily for 30 days. Qty: 30 Tablet, Refills: 0      insulin glargine (LANTUS,BASAGLAR) 100 unit/mL (3 mL) inpn 25 Units by SubCUTAneous route daily for 28 days. Qty: 7 mL, Refills: 1      metFORMIN (GLUCOPHAGE) 500 mg tablet Take 1 Tablet by mouth two (2) times daily (with meals) for 30 days.   Qty: 60 Tablet, Refills: 0 Blood-Glucose Meter monitoring kit Use 4 times per day (before meals and bedtime) for blood glucose testing    Type 2 diabetes mellitus with hyperglycemia E11. 65.  Qty: 1 Kit, Refills: 0      lancets misc Use 4 times per day (before meals and bedtime) for blood glucose testing    Type 2 diabetes mellitus with hyperglycemia E11. 65.  Qty: 1 Each, Refills: 11      glucose blood VI test strips (CareTouch Test Strip) strip Use 4 times per day (before meals and bedtime) for blood glucose testing    Type 2 diabetes mellitus with hyperglycemia E11. 65.  Qty: 100 Strip, Refills: 2           Activity: Activity as tolerated  Diet: Diabetic Diet  Wound Care: None needed    Approximate time spent in patient care on day of discharge: 45    Signed:   Adelina Kramer MD  4/2/2022  3:37 PM

## 2022-04-02 NOTE — PROGRESS NOTES
4/2/22  4:01 PM    CM noted dc order. Patient has a PCP appointment scheduled for 4/11 (had it set up before coming into the hospital). He is planning to attend the appointment to establish as a new patient. CM added the Diabetes clinic information to the AVS for the patient to call and make an appointment. Reviewed with patient and he will follow up. No further CM needs noted. [address] Management Interventions  PCP Verified by CM:  Yes  Support Systems: Spouse/Significant Other  Confirm Follow Up Transport: Family  The Plan for Transition of Care is Related to the Following Treatment Goals : New onset type 2 diabetes mellitus  Discharge Location  Patient Expects to be Discharged to[de-identified] Home with outpatient services

## 2022-04-02 NOTE — PROGRESS NOTES
Bedside shift change report given to Richard RN (oncoming nurse) by Purvis Blizzard RN (offgoing nurse). Report included the following information SBAR, Kardex, Intake/Output, MAR and Recent Results.

## 2022-04-11 NOTE — PROGRESS NOTES
Physician Progress Note      PATIENTMona Champagne  [facility] #:                  [id number]  :                       1970  ADMIT DATE:       3/30/2022 5:40 PM  [address] [place] DATE:        2022 4:52 PM  RESPONDING  PROVIDER #:        Adam Padron MD          QUERY TEXT:    Dear attending,    Patient admitted with DKA. Documentation reflects acute pancreatitis. If possible, please document in the progress notes and discharge summary if acute pancreatitis was: The medical record reflects the following:  Risk Factors: Hx. hyper triglycerides  Clinical Indicators: 3/31-Triglycerides-1513, Ca 7.0, -Triglycerides-805  3/31-Per PN- Also of note is elevated TGs to 1500 so do query chronic pancreatitis leading to endocrine dysfx. Does not drink EtOH. Per dc summary- Acute Pancreatitis: Normal lipase, but hypocalcemia and clinical hx concerning. CT w/ mild stranding. Suspect 2/2 hyperTG as noted above. Potentially chronic in nature. Tolerating low fat diet at dc  Treatment: Monitor labwork, imaging, IV NS 2 liters bolus on 3/30, x 1 liter bolus on 3/31, IV NS @ 150 cc/hour        Thank you,  Rebecca Villanueva RN, BSN  Clinical documentation Improvement  [phone]  Options provided:  -- acute on chronic pancreatitis confirmed after study  -- acute pancreatitis confirmed after study  -- acute pancreatitis ruled out after study, chronic pancreatitis only  -- Other - I will add my own diagnosis  -- Disagree - Not applicable / Not valid  -- Disagree - Clinically unable to determine / Unknown  -- Refer to Clinical Documentation Reviewer    PROVIDER RESPONSE TEXT:    acute on chronic pancreatitis confirmed after study.     Query created by: Kimberly Joseph on 2022 9:16 AM      Electronically signed by:  Adam Padron MD 2022 11:34 AM

## 2022-04-20 ENCOUNTER — CLINICAL SUPPORT (OUTPATIENT)
Dept: DIABETES SERVICES | Age: 52
End: 2022-04-20
Payer: COMMERCIAL

## 2022-04-20 DIAGNOSIS — E11.9 TYPE 2 DIABETES MELLITUS WITHOUT COMPLICATION, UNSPECIFIED WHETHER LONG TERM INSULIN USE (HCC): Primary | ICD-10-CM

## 2022-04-20 PROCEDURE — G0108 DIAB MANAGE TRN  PER INDIV: HCPCS | Performed by: DIETITIAN, REGISTERED

## 2022-04-20 NOTE — PROGRESS NOTES
[address] [place] [address] Diabetes Health  Diabetes Self-Management Education & Support Program  Pre-program Assessment    Reason for Referral: newly dx Type 2 dm  Referral Source: Aranza Templeton MD  Services requested: DSMES    ASSESSMENT    From my perspective, the participant would benefit from [facility] specifically related to Reducing risks, Healthy eating, Monitoring, Physical activity, Taking medications, Healthy coping and Problem solving. Will adapt DSMES program to build on participant's skills score, strengths in confidence score and strengths in preparedness score as noted in the Diabetes Skills, Confidence, and Preparedness Index. During the program, we will focus on providing DSMES that specifically addresses participant's interest in Reducing risks, Healthy eating, Monitoring, Physical activity, Taking medications, Healthy coping and Problem solving, as shown by their reported readiness to change. [address] [place] [address] shared that dx with pancreatitis, noted high TG, ketones, HTN during his admission and that they needed 3 days for his BG to stabilize for him to be discharged. He has made changes since dx and had developed a schedule for his meals, medications and testing routine. The participant would be best served by attending weekly group class series. Diabetes Self-Management Education Follow-up Visit: 5/3/22       Clinical Presentation  Jin Maria is a 54 y.o. White male referred for diabetes self-management education. Participant has Type 2 DM on insulin for <1 year. Family history positive for diabetes - parents, both grandmothers.  Patient reports receiving DSMES services in the past. Most recent A1c value:   Lab Results   Component Value Date/Time    Hemoglobin A1c 10.1 (H) 03/30/2022 07:34 PM       Diabetes-related medical history:  Acute complications- none  Neurological complications  Peripheral neuropathy  Microvascular disease - none  Macrovascular disease  CAD  Other associated conditions         Diabetes-related medications:  Current dosing:   Key Antihyperglycemic Medications             insulin glargine (LANTUS,BASAGLAR) 100 unit/mL (3 mL) inpn 25 Units by SubCUTAneous route daily for 28 days. metFORMIN (GLUCOPHAGE) 500 mg tablet Take 1 Tablet by mouth two (2) times daily (with meals) for 30 days. Blood Pressure Management  Key ACE/ARB Medications             lisinopriL (PRINIVIL, ZESTRIL) 10 mg tablet Take 1 Tablet by mouth daily for 30 days. Lipid Management  Key Antihyperlipidemia Meds             atorvastatin (LIPITOR) 40 mg tablet Take 1 Tablet by mouth nightly for 30 days. fenofibrate nanocrystallized (TRICOR) 145 mg tablet Take 1 Tablet by mouth daily for 30 days. Clot Prevention  Key Anti-Platelet Anticoagulant Meds     The patient is on no antiplatelet meds or anticoagulants. Learning Assessment  Learning objectives Educator assessment (4/20/2022)   Diabetes Disease Process  The participant can   A) describe diabetes in basic terms;   B) state the type of diabetes they have; &   C) state accepted blood glucose targets. Healthy Eating  The participant can   A) identify carbohydrate foods; &   B) accurately read food labels. Being Active  The participant can  A) state the benefits of physical activity;  B) report their current PA practices;  C) identify PA they would consider incorporating in their lives; &  D) develop an implementation plan. Monitoring  The participant can  A) operate their blood glucose meter; &  B) describe how they log their blood glucoses to share with their provider. Taking Medications  The participant can  A) name their diabetes medications;  B) state the purpose and dose;  C) note side effects; &  D) describe proper storage, disposal & transport (if appropriate). Healthy Coping  The participant can    A) describe their response to diabetes diagnosis;   B) describe their specific coping mechanisms;  C) identify supportive people and/or other resources that positively support their diabetes self-care and health. Reducing Risks  The participant can describe the preventive measures used by providers to promote health and prevent diabetes complications. Problem Solving  The participant can   A) identify signs, symptoms & treatment of hypoglycemia;   B) identify signs, symptoms & treatment of hyperglycemia;  C) describe their sick day plan; &  D) identify BG patterns to discuss with their provider. Yes  Yes  Yes        Yes, needs review  Yes, needs review        Yes  Yes  Yes, needs review  Yes, needs review        Yes, will review targets and technique  Yes          Yes  Yes, needs review  No, reports mild symptoms at this time  did not review      Yes  No  Yes        No          No  No  No  No     Characteristics to Learning   Barriers to Learning   [] Cognitive loss  [] Mental retardation   [] Intellectual delay/cognitive impairment  [] Psychiatric disorder  [] Visually impaired  [] Hearing loss                 [] Low literacy (difficulty with written text)  [] Low numeracy (difficulty with mathematical information  [] Low health literacy (difficulty with understanding health information & services  [] Language  [] Functional limitation  [] Pain   [] Financial  [] Transportation  [x] None   Favorite Ways to Learn   [] Lecture  [x] Slides  [x] Reading [x] Video-Internet  [] Cassettes/CDs/MP3's  [] Interactive Small Groups [x] Other- hands on     Behavioral Assessment  Current self-care practices  Educator assessment (4/20/2022)   Healthy Eating  Current practices  24-hour Dietary Recall:  Breakfast: 2 eggs, cheese, turkey sausage, wheat Ben's with avocado. Lunch: tuna, mustard, s/p dill relish on low carb wrap, sf yogurt with berries, veggies and taziki. Healthy Choice power bowl entrees. Salad - see below. Dinner: protein (chicken, turkey, shrimp,steak, pork) sweet potato, steam veg. Salad with lean protein- lettuce,rich, paloma, light dressing - maybe tortilla wrap. Snacks: celery/taziki, sf greek, sf jello, apple, cheese, nuts, berries with yogurt. SF fruit pop. Beverages: water, coffee black, Crystal Light. Alcohol: rare     Would benefit from [facility] related to Healthy Eating: Yes      Eats a carbohydrate controlled diet: No      Stage of change: Action   Being Active  Current practices  How many days during the past week have you performed physical activity where your heart beats faster and your breathing is harder than normal for 30 minutes or more? 6 days    How many days in a typical week do you perform activity such as this?  7 days     Would benefit from [facility] related to Being Active: Yes      Exercises 150 minutes/week: Yes, recently started walking was sedentary past 2-3 years. Stage of change: Preparation     Monitoring  Current practices  Do you monitor your blood sugar? Yes    How often do you monitor? 4x/day    What are the range of readings? Breakfast:  mg/dL  Lunch:  mg/dL  Dinner:  mg/dL  Bedtime:  mg/dL    Do you know your last A1c measurement? Yes    Do you know the meaning of the A1c? Yes     Would benefit from [facility] related to Monitoring: Yes      Uses BG readings to establish trends and understand BG patterns: No, currently most of his BG values are stable, will re-evaluate once he has started to include meal planning. Stage of change: Action   Taking Medication  Current practices  Do you understand what your diabetes medications do? Yes    How often do you miss doses of your diabetes medications? Never    Can you afford your diabetes medications? Yes   Would benefit from [facility] related to Taking Medication: Yes      Takes medications consistently to receive full benefit: Yes      Stage of change: Action       Healthy Coping   Current state  Diabetes Skills, Confidence and Preparedness Index:   Total score: 5.3  Skills: 4.6  Confidence: 5.7  Preparedness: 5.9   Would benefit from [facility] related to Healthy Coping: Yes      Identifies specific people, organizations,etc, that actively support their diabetes self-care efforts: Yes, Ankita Sandy, family - mom, both sides of their families. Stage of change: Action     Reducing Risks  Current state  Vaccines:  Influenza: There is no immunization history on file for this patient. 9/2020    Pneumococcal:   There is no immunization history on file for this patient. Hepatitis:   There is no immunization history on file for this patient. Examinations:  Diabetic Foot and Eye Exam HM Status   Topic Date Due    Diabetic Foot Care  Never done    Eye Exam  Never done        Dental exam: 4/2022  Eye exam: DUE  Foot exam: DUE    Heart Protection:  BP Readings from Last 2 Encounters:   04/02/22 130/76        Lab Results   Component Value Date/Time    LDL,Direct 51 04/02/2022 03:32 AM    LDL, calculated Not calculated due to elevated triglyceride level 04/02/2022 03:32 AM        Kidney Protection:  No results found for: MCACR, MCA1, MCA2, MCA3, MCAU, MCAU2, MCALPOCT     Would benefit from [facility] related to Reducing Risks: Yes      Actively participates in decision-making with provider regarding secondary prevention:  No      Stage of change: Preparation   Problem Solving  Current state  Hypoglycemia Management:  What are signs and symptoms of hypoglycemia that you experience? Pt reported being unaware of s/s of hypoglycemia    How do you prevent hypoglycemia? Consistent meals/snack times, Take medications as instructed and Monitor blood glucose before exercise    How do you treat hypoglycemia? Rule of 15    Hyperglycemia Management:  What are signs and symptoms of hyperglycemia that you experience? Extreme thirst, Intense hunger, Frequent urination, Fatigue    How can you prevent hyperglycemia?  Take medication as instructed, Focus on carbohydrate counting/meal planning, Engage in regular physically active, Monitor blood glucose    Sick Day Management:  What do you do differently on sick days? Pt reported being unaware of self-management on sick days    Pattern Management:  Do you notice blood glucose patterns when you look at the readings in your meter or logbook? No    How do you use the blood glucose readings from your meter or logbook? Adjust meals based on results     Would benefit from [facility] related to Problem Solving: Yes      Articulates appropriate strategies to address hypoglycemia, hyperglycemia, sick day care and BG pattern: No      Stage of change: Preparation     Note: Content derived from the American Association of Diabetes Educators' Diabetes Education Curriculum: A Guide to Successful Self-Management (3rd edition)      Perfecto Villareal RD on 4/20/2022 at 3:16 PM    I have personally reviewed the health record, including provider notes, laboratory data and current medications before making these care and education recommendations. The time spent in this effort is included in the total time. Total minutes: 28    [id number] [facility] Adaptations for Telehealth:  Abby Husain, was evaluated in person. Overall SCPI score: 5.3 Skills Score: 4.6  Low: Blood Sugar Monitoring(Q4),Problem Solving(Q6) Confidence Score: 5.7  Low: Problem JLIICQL(K5) Preparedness Score: 5.9  Low: Taking Medication(Q5)  Healthy Eating Score: 6.0  Low: Skills(Q1) Taking Medication Score: 5.0  Low: Preparedness(Q5) Blood Sugar Monitoring Score: 4.6  Low: BXRUSY(N3) Reducing Risks Score: 5.3  Low: Skills(Q5),Skills(Q9),Preparedness(Q4)  Problem Solving Score: 4.0  Low: Skills(Q6) Healthy Coping Score: 6.0  Low: Skills(Q7),Confidence(Q2),Preparedness(Q3) Being Active Score: 7.0  Low: Confidence(Q5),Preparedness(Q2)    Skills/Knowledge Questions  1. I know how to plan meals that have the best balance between carbohydrates, proteins and vegetables. 5  2.  I know how my diabetes medications (pills, injectables and/or insulin) work in my body. 6  3. I know when to check my blood sugar if I want to see how my body responded to a meal. 6  4. I know when to check my blood sugars to determine if my medication or insulin doses are correct. 2  5. I know what to do to prevent a low blood sugar when I exercise (either before, during, or after). 5  6. When I am sick, I know what to do differently with my diabetes management. 2  7. I know how stress can affect my diabetes management. 6  8. When I look at my blood sugars over a given week, I can explain what my blood sugar pattern is. 4  9. I know what my target levels are for A1c, blood pressure and cholesterol. 5  Confidence Questions  1. I am confident that I can plan balanced meals and snacks. 6  2. I am confident that I can manage my stress. 6  3. I am confident that I can prevent a low blood sugar during or after exercise. 6  4. I am confident that the next time I eat out, I will be able to choose foods that best keep my blood sugars in target. 6  5. I am confident I can include exercise into my schedule. 7  6. I am confident that I can use my daily blood sugars to adjust my diet, my activity, and/or my insulin. 5  7. When something out of my normal routine happens, I am confident that I can problem-solve and keep my diabetes on track. 4  Preparedness Questions  1. Within the next month, I will begin to eat more balanced meals and snacks. 8  2. Within the next month, I will choose an exercise activity and I will start fitting it into my schedule. 8  3. Within the next month, I will make a list of stress management options that work for me. 6  4. Within the next month, I will consistently plan ahead to prevent low blood sugars. 5  5. Within the next month, I will start adjusting my insulin doses on my own. 4  6. Within the next month, I will begin making changes to my diabetes management based on my daily blood sugars (eg - eating, activity and/or insulin). 6  7.  Within the next month, I will begin making changes to my diabetes management to meet my overall goals (eg - eating, activity and/or insulin).  6

## 2022-05-03 ENCOUNTER — CLINICAL SUPPORT (OUTPATIENT)
Dept: DIABETES SERVICES | Age: 52
End: 2022-05-03
Payer: COMMERCIAL

## 2022-05-03 DIAGNOSIS — E11.9 TYPE 2 DIABETES MELLITUS WITHOUT COMPLICATION, UNSPECIFIED WHETHER LONG TERM INSULIN USE (HCC): Primary | ICD-10-CM

## 2022-05-03 PROCEDURE — G0109 DIAB MANAGE TRN IND/GROUP: HCPCS | Performed by: DIETITIAN, REGISTERED

## 2022-05-04 NOTE — PROGRESS NOTES
[employer] [facility]  Diabetes Self-Management Education & Support Program  Encounter note    SUMMARY  Diabetes self-care management training was completed related to Reducing risks. The participant will return on May 10 to continue DSMES related to Healthy eating and Monitoring. The participant did not identify SMART Goal(s): and will practice knowledge and skills related to reducing risks to improve diabetes self-management. EVALUATION:  Guevara Paz participated in class discussions and asked very appropriate questions related to his new dx of diabetes. He continues to test and log his BG. We reviewed what is diabetes along with reducing risks today. He was encouraged to bring food log and BG diary with him to next class. Discussed setting a SMART goal over the next few week. RECOMMENDATIONS:  1) start daily foot care and self exam.  2) follow up with provider related to comprehensive foot exam  3) need to schedule dilated eye exam w/in the next year. Next provider visit is scheduled for pending. DATE DSMES TOPIC EVALUATION     5/3/22 WHAT IS DIABETES?   a. Role of the normal pancreas in energy balance and blood glucose control   b. The defect seen in diabetes   c. Signs & symptoms of diabetes   d. Diagnosis of diabetes   e. Types of diabetes   f. Blood glucose targets in non-pregnant & non-pregnant adults       The participant knows   Their type of diabetes Yes   The basic physiologic defect Yes   Blood glucose targets Yes     DATE DSMES TOPIC EVALUATION     5/3/22 HOW DO I STAY HEALTHY?   a. Prevention    Vaccinations    Preconception care (if applicable)  b.  Examinations    Eye     Dental   Foot   c. Diabetic complications' prevention    Heart health    Kidney Health    [facility]    Sleep health      The participant has a personal diabetes care record to keep abreast of diabetes health Yes    The participant would benefit from discussing vaccines with provider at next follow up visit, is due for foot and dilated eye exam. Encouraged to begin daily self foot exam and care. Ramón Malik RD, [facility] on 5/4/2022 at 1:41 PM    I have personally reviewed the health record, including provider notes, laboratory data and current medications before making these care and education recommendations. Total minutes: 391    [id number] [facility] Adaptations for Telehealth:  Lamon Duverney, was evaluated in person for class today.

## 2022-05-10 ENCOUNTER — CLINICAL SUPPORT (OUTPATIENT)
Dept: DIABETES SERVICES | Age: 52
End: 2022-05-10
Payer: COMMERCIAL

## 2022-05-10 DIAGNOSIS — E11.9 TYPE 2 DIABETES MELLITUS WITHOUT COMPLICATION, UNSPECIFIED WHETHER LONG TERM INSULIN USE (HCC): Primary | ICD-10-CM

## 2022-05-10 PROCEDURE — G0109 DIAB MANAGE TRN IND/GROUP: HCPCS | Performed by: DIETITIAN, REGISTERED

## 2022-05-11 NOTE — PROGRESS NOTES
[employer] [facility]  Diabetes Self-Management Education & Support Program  Encounter note    SUMMARY  Diabetes self-care management training was completed related to Healthy eating and Monitoring. The participant will return on May 17 to continue DSMES related to Physical activity and Taking medications. The participant did not identify SMART Goal(s): and will practice knowledge and skills related to reducing risks and healthy eating and monitoring to improve diabetes self-management. EVALUATION:  Heather Pearce participated in class discussion and asked great questions related to healthy eating and his BG results today. Encouraged consistent meals and to avoid going >5 hours between meals. RECOMMENDATIONS:  1) do not miss meals and consider a snack if they will be >5 hours apart. 2) consider building meals to about 4 servings of carbohydrate foods/60g of carbohydrate. 3) if snacks are needed, plan for about 15-20 g carbohydrate. 4) continue to monitor BG - currently three times daily; more if needed. Next provider visit is scheduled for pending. DATE DSMES TOPIC EVALUATION     5/10/22 WHAT CAN I EAT?   a. General principles   b. Determining a healthy weight   c. Nutritional terms & tools    Healthy Plate method    Carbohydrate Counting    Reading food labels    Free apps       The participant    Uses Healthy Plate principles in constructing meals Yes   Reads food labels in choosing acceptable foods Yes    The participant would benefit from building meals to meet guidelines for carbohydrates and consider having meals within 4-5 hours. Careful with going too long without meal as this may result in low BG and require treatment and additional kcal intakes. DATE DSMES TOPIC EVALUATION     5/10/22 HOW CAN BLOOD GLUCOSE MONITORING HELP ME?   a. Value of blood glucose monitoring   b. Realistic expectations   c. Blood glucose monitoring targets   d.  Target adjustments e. Setting a1c & blood glucose targets with provider   f. Meter selection    g. Technique for obtaining blood droplet   h. Blood glucose testing sites   i. Determining best times to test   j. Data sharing with provider        The participant    Can demonstrate their glucometer procedure Yes   Logs their BG readings Yes    The participant would benefit from continuing to monitor BG - he shared his logs and values are typically within target with exception of 1 hypoglycemia events which he could explain (too long between meals). Shared that this has not happened in the past. He is currently not taking medications for DM. Mohan Rankin RD, [facility] on 5/11/2022 at 2:43 PM    I have personally reviewed the health record, including provider notes, laboratory data and current medications before making these care and education recommendations.   Total minutes: 374    [id number] [facility] for Telehealth:  Savannah Romero, was evaluated in person for class

## 2022-05-17 ENCOUNTER — CLINICAL SUPPORT (OUTPATIENT)
Dept: DIABETES SERVICES | Age: 52
End: 2022-05-17
Payer: COMMERCIAL

## 2022-05-17 DIAGNOSIS — E11.9 TYPE 2 DIABETES MELLITUS WITHOUT COMPLICATION, UNSPECIFIED WHETHER LONG TERM INSULIN USE (HCC): Primary | ICD-10-CM

## 2022-05-17 PROCEDURE — G0109 DIAB MANAGE TRN IND/GROUP: HCPCS | Performed by: DIETITIAN, REGISTERED

## 2022-05-17 NOTE — PROGRESS NOTES
[employer] Program for Diabetes Health  Diabetes Self-Management Education & Support Program  Encounter Note    SUMMARY  Diabetes self-care management training was completed related to taking medications and physical activity. The participant will return on May 24 to continue DSMES related to healthy coping and problem solving. The participant did not identify SMART Goal(s) and will practice knowledge and skills related to reducing risks, healthy eating and monitoring and being active and medications to improve diabetes self-management. EVALUATION:  Kush Fernandez continues to be engaged in his diabetes management  - shared that he is having difficulty with coverage for his Lantus. He and I discussed options: he has downloaded Copay card from the Lantus website - encouraged him to contact [phone] number provided to see if card needs to be activated. If this does not work, contact insurer to investigate long acting insulin options to replace the Lantus and discuss Rx with provider and may need to explore pre-authorization. Kush Fernandez participated in discussions and activities related to physical activity. Identified barriers and facilitators that help him to be more active. RECOMMENDATIONS:  1) check into activating the copay card, use the link on the webpage for financial assistance if needed. 2) contact health/pharmacy benefits to see if there is another long acting insulin you have better coverage for with your policy ([address] [place] [address], [facility]). 3) continue to explore options for physical activity. TOPICS DISCUSSED TODAY:  HOW DO MY DIABETES MEDICATIONS WORK? 61  HOW DOES PHYSICAL ACTIVITY AFFECT MY DIABETES? 60    Next provider visit is scheduled for pending. DATE DSMES TOPIC EVALUATION     5/17/2022 HOW DO MY DIABETES MEDICATIONS WORK?   a. Type 1 medications & methods    Insulin injections    Injection sites   b. Type 2 medications    Oral agents    GLP-1 agonists   c.  Hypoglycemia symptoms & treatment    Glucagon emergency kits   d. General guidance regarding insulin use whether Type 1, 2 or gestational diabetes    Storage of insulin    Disposal     Traveling with medications   e. Barriers to medication adherence      The participant    Can describe the expected action & side effects of prescribed diabetes medications: Yes   Can demonstrate injection technique (if applicable): Yes    The participant needs to address reaching out to provider for pre-authorization and discuss alternative to Lantus if needed was provided today if this will address his out-of-pocket expenses related to his insulin RX. Shared that he downloaded an offset card and that he is not getting assistance with filing this request at his local pharmacy. We reviewed his injection technique at assessment visit      [facility] EVALUATION     5/17/2022 HOW DOES PHYSICAL ACTIVITY AFFECT MY DIABETES?   a. Benefits of physical activity   b. Beginning a program of physical activity    Walking    Pedometers    Goal setting   c. Structured physical activity program    Aerobic activity    Resistance    Flexibility    Balance   d. Physical activity program progression   e. Safety issues   f. Barriers to physical activity   g. Facilitators of physical activity        The participant has established a regular physical activity plan: Yes    The participant needs to address barriers to activity - encouraged to continue with his walking plan but have a plan for weather and time to be able to maintain and then expand his exercise. Ramon Muhammad RD, [facility] on 5/17/2022 at 8:50 AM    I have personally reviewed the health record, including provider notes, laboratory data and current medications before making these care and education recommendations. Total minutes: 721    [facility] Adaptations for Telehealth:  Darrick Merritt, was evaluated in person for class today.

## 2022-05-24 ENCOUNTER — CLINICAL SUPPORT (OUTPATIENT)
Dept: DIABETES SERVICES | Age: 52
End: 2022-05-24
Payer: COMMERCIAL

## 2022-05-24 DIAGNOSIS — E11.9 TYPE 2 DIABETES MELLITUS WITHOUT COMPLICATION, UNSPECIFIED WHETHER LONG TERM INSULIN USE (HCC): Primary | ICD-10-CM

## 2022-05-24 PROCEDURE — G0109 DIAB MANAGE TRN IND/GROUP: HCPCS

## 2022-05-24 NOTE — PROGRESS NOTES
[employer] [facility] for Diabetes Health  Diabetes Self-Management Education & Support Program  Encounter Note    SUMMARY  Diabetes self-care management training was completed related to healthy coping and problem solving. he participant will return in 6 weeks for follow up. The participant  identify SMART Goal(s) and will practice knowledge and skills related to being active to improve diabetes self-management. EVALUATION:  MEDICATION: pt states was stress out about his insurance not covering his insulin lantus, states this is resolve now since his doctor will order [address] and it will be cover by his insurance. EXERCISE: pt with interest to increase his exercise. CONTROL SOLUTION METER= pt taught how to use the control solution and when to use it, demonstration done. STRESS: During the class , express he walks in nature for stress and feels hopeful about his diabetes now that he have learn a lot. RECOMMENDATIONS:  - Continue with lifestyle changes. - [facility]  - Call if any questions. TOPICS DISCUSSED TODAY:  HOW DO I FIND SUPPORT TO TACKLE THIS CONDITION? 61  HOW DO I FIGURE OUT WHAT'S INFLUENCING MY BLOOD GLUCOSES? 60      Next provider visit is scheduled : no appt its been schedule at this time        SMART GOAL(S)  1. Increase physical activity 30 minutes 7 times over the next week. -   ACHIEVEMENT OF GOAL(S) : 0-24%               DATE DSMES TOPIC EVALUATION     5/24/2022 HOW DO I FIND SUPPORT TO TACKLE THIS CONDITION?   a. Normal responses to diabetes diagnosis or complication    Shock    Anger & resentment    Guilt/self-blame    Sadness & worry    Depression     Anxiety    Pregnancy   b. Constructive strategies to normal responses     Exploring feelings & attitudes    Motivation: Cost versus benefits analysis    Problem-solving: Chain analysis    Obtaining support: Communicating   i. Family & friends   ii. Health team   iii.  Community c. Stress    Symptoms    Managing stress    Fill your tool kit        The participant can identify people that support them in caring for their diabetes health: Mother , family     The participant would like to pursue the following in keeping themselves healthy after completing the program:  Increase exercise     The participant needs to address blood sugars once it start [address] . Pt stress out over change of insulin  . Advised to call us for help when have an issue with medication like this one. DATE DSMES TOPIC EVALUATION     5/24/2022 HOW DO I FIGURE OUT WHAT'S INFLUENCING MY BLOOD GLUCOSES?   a. Problem solving using SOAR    Set goals    Sort options    Arrive at a plan    Reaffirm plan   b. Common problems in diabetes self-care    Hypoglycemia    Hyperglycemia    Sick days   c. Pattern management   d. Disaster preparedness plan        The participant has an action plan for    Hypoglycemia: Yes   Hyperglycemia: Yes   Sick days: Yes   During disasters: Yes    The participant needs to address implementation of disaster plan. Dania Odom RN on 5/24/2022 at 11:52 AM    I have personally reviewed the health record, including provider notes, laboratory data and current medications before making these care and education recommendations. The time spent in this effort is included in the total time. Total minutes: [address], Patient evaluated at DM education class group , Dania Odom RN [employer] - instructor  Today .

## 2022-07-12 ENCOUNTER — VIRTUAL VISIT (OUTPATIENT)
Dept: DIABETES SERVICES | Age: 52
End: 2022-07-12
Payer: COMMERCIAL

## 2022-07-12 DIAGNOSIS — E11.9 TYPE 2 DIABETES MELLITUS WITHOUT COMPLICATION, UNSPECIFIED WHETHER LONG TERM INSULIN USE (HCC): Primary | ICD-10-CM

## 2022-07-12 PROCEDURE — G0108 DIAB MANAGE TRN  PER INDIV: HCPCS | Performed by: DIETITIAN, REGISTERED

## 2022-07-12 NOTE — PROGRESS NOTES
[facility] Program for Diabetes Health  Diabetes Self-Management Education & Support Program  Post-program Evaluation    EVALUATION @ COMPLETION OF THE PROGRAM    Puja Dominique completed 9 hours of diabetes self-management education. The participant acquired new knowledge and demonstrated new skills during the program.     The participant worked on the following SMART GOAL(s) to improve their diabetes self-management:    1. Increase physical activity 30 minutes 7 times over the next week  ACHIEVEMENT OF GOAL(S) : %  Mr Suzette Ortega is walking, biking, or using stationary bike 20-30 minutes every day    The participant's pre-program A1c was   Lab Results   Component Value Date/Time    Hemoglobin A1c 10.1 (H) 03/30/2022 07:34 PM   . The post-evaluation A1c is 5.4%. The participant improved their Diabetes Skills, Confidence and Preparedness Index (scored out of 7): Total score: 6.2  Skills: 6.1  Confidence: 6.1  Preparedness: 6.3    FINAL RECOMMENDATIONS:  No recommendations at this time other than to call us and schedule an appt if he needs additional education or help with problem solving. Next provider visit is scheduled for n/a - saw PCP 7/11 - awaiting A1C results (updated)       Roxann Matthew RD on 7/12/2022     Metric Patient's responses (7/12/2022) Areas for improvement     Healthy Eating       The participant is using the Healthy Plate to control carbohydrate intake - Yes    The participant reads food labels accurately - Yes     n/a     Being Active       The participant performs physical activity where your heart beats faster and your breathing is harder than normal for 30 minutes or more?  7 day(s)     In a typical week, the participant performs physical activity 7 day(s)     n/a     Monitoring   The participant is monitoring their blood sugars?  Yes    The participant is monitoring 4x/day    Blood sugar ranges are 70s-115 mg/dL  Typically  but occasionally has one in the 70s after not having enough carbs at a meal or timing of exercise is different    The participant improved their A1c - Yes    The participant understands the meaning of the A1c - Yes     N/a  May be able to reduce frequency of monitoring after A1C comes back     Taking Medications   The participant understands what their diabetes medications do Yes    The participant can afford your diabetes medications Yes  - after he switches to basaglar  The participant does not miss doses of their diabetes medications - never     n/a     Healthy Coping     The participant feels supported by family, friends and others related to their diabetes self-care practices - Yes    The participant plans on utilizing the following community resources after completion of the program: ADA Nutrition, Diabetes Food Hub, CLEAR   N/a  Has had discussions with loved ones about what he \"can and cannot do or eat\"     Reducing Risks   Vaccines:  Influenza: There is no immunization history on file for this patient. Pneumococcal:   There is no immunization history on file for this patient. Hepatitis:   There is no immunization history on file for this patient. Examinations:  Diabetic Foot and Eye Exam HM Status   Topic Date Due    Diabetic Foot Care  Never done    Eye Exam  Never done        Dental exam: up to date    Foot exam: done on: 7/11    Heart Protection:  BP Readings from Last 2 Encounters:   04/02/22 130/76        Lab Results   Component Value Date/Time    LDL,Direct 51 04/02/2022 03:32 AM    LDL, calculated Not calculated due to elevated triglyceride level 04/02/2022 03:32 AM        Key Anti-Platelet Anticoagulant Meds     The patient is on no antiplatelet meds or anticoagulants.            Kidney Protection:  No results found for: Pauly Simon, LYNETTE2, Vaishali [address], MCAU, [facility]   The participant would benefit from additional attention to:    Vaccines:  [] Influenza  [] Pneumococcal  [] Hepatitis    Examinations:  [] Dilated eye exam  [] Dental exam  [] Foot exam    Other:  [] Reviewing long-term complications     Problem Solving   Hypoglycemia Management:  The participant knows the signs and symptoms of hypoglycemia: Shaking/trembling, Nervous feeling, \"heart palpiations\"    The participant knows how to prevent hypoglycemia: consistent meals/snack time and take medications as instructed    The participant knows how to treat hypoglycemia: Rule of 15    Hyperglycemia Management:  The participant knows their signs and symptoms of hyperglycemia: lethargy, brain fog     The participant knows how to prevent hyperglycemia: take medications as instructed, focus on carbohydrate counting/meal planning, engage in regular physical activity and monitor blood sugar    Sick Day Management:  The participant knows what to do differently on sick days: Stay hydrated, Check blood glucose every 2-4 hours, Eat meals, soft foods, or drink caloric beverages every 4 hours, Take diabetes medication as instructed by provider     Pattern Management:  The participant can notice blood glucose patterns when looking at their blood glucose readings: Yes    How do you use the blood glucose readings from your meter or logbook: understand how body responds to meals   n/a   Note: Content derived from the American Association of Diabetes Educators' Diabetes Education Curriculum: A Guide to Successful Self-Management (3rd edition)      I have personally reviewed the health record, including provider notes, laboratory data and current medications before making these care and education recommendations. The time spent in this effort is included in the total time. Total minutes: 28    [id number] [facility] Emergency Adaptations for Telehealth:  Puja Dominique, was evaluated through a synchronous (real-time) audio-video encounter. The patient (or guardian if applicable) is aware that this is a billable service, which includes applicable co-pays.  This Virtual Visit was conducted with patient's (and/or legal guardian's) consent. The visit was conducted pursuant to the emergency declaration under the [address] [facility], [address] [place] [address] and the [place] and [employer] General Act. Patient identification was verified, and a caregiver was present when appropriate. The patient was located at: Home: [address] [place] [address]   [facility] [id number]  The provider was located at: [facility] [address] [place] [address] [id number] [facility] [id number]        Overall SCPI score: 6.2 Skills Score: 6.1  Low: Problem Solving(Q6) Confidence Score: 6.1  Low: Healthy Eating(Q1),Healthy Coping(Q2),Reducing Risks(Q3),Healthy Eating(Q4),Blood Sugar Monitoring(Q6),Problem THRRIAS(T7) Preparedness Score: 6.3  Low: Taking Medication(Q5)  Healthy Eating Score: 6.3  Low: Skills(Q1),Confidence(Q1),Confidence(Q4) Taking Medication Score: 5.0  Low: Preparedness(Q5) Blood Sugar Monitoring Score: 6.6  Low: Skills(Q4),Confidence(Q6) Reducing Risks Score: 6.0  Low: Skills(Q5),Skills(Q9),Confidence(Q3),Preparedness(Q4)  Problem Solving Score: 6.0  Low: Skills(Q6) Healthy Coping Score: 6.0  Low: Skills(Q7),Confidence(Q2),Preparedness(Q3) Being Active Score: 7.0  Low: Confidence(Q5),Preparedness(Q2)    Skills/Knowledge Questions  1. I know how to plan meals that have the best balance between carbohydrates, proteins and vegetables. 6  2. I know how my diabetes medications (pills, injectables and/or insulin) work in my body. 6  3. I know when to check my blood sugar if I want to see how my body responded to a meal. 7  4. I know when to check my blood sugars to determine if my medication or insulin doses are correct. 6  5. I know what to do to prevent a low blood sugar when I exercise (either before, during, or after). 6  6. When I am sick, I know what to do differently with my diabetes management. 5  7. I know how stress can affect my diabetes management. 6  8.  When I look at my blood sugars over a given week, I can explain what my blood sugar pattern is. 7  9. I know what my target levels are for A1c, blood pressure and cholesterol. 6  Confidence Questions  1. I am confident that I can plan balanced meals and snacks. 6  2. I am confident that I can manage my stress. 6  3. I am confident that I can prevent a low blood sugar during or after exercise. 6  4. I am confident that the next time I eat out, I will be able to choose foods that best keep my blood sugars in target. 6  5. I am confident I can include exercise into my schedule. 7  6. I am confident that I can use my daily blood sugars to adjust my diet, my activity, and/or my insulin. 6  7. When something out of my normal routine happens, I am confident that I can problem-solve and keep my diabetes on track. 6  Preparedness Questions  1. Within the next month, I will begin to eat more balanced meals and snacks. 8  2. Within the next month, I will choose an exercise activity and I will start fitting it into my schedule. 8  3. Within the next month, I will make a list of stress management options that work for me. 6  4. Within the next month, I will consistently plan ahead to prevent low blood sugars. 6  5. Within the next month, I will start adjusting my insulin doses on my own. 4  6. Within the next month, I will begin making changes to my diabetes management based on my daily blood sugars (eg - eating, activity and/or insulin). 7  7. Within the next month, I will begin making changes to my diabetes management to meet my overall goals (eg - eating, activity and/or insulin).  8